# Patient Record
Sex: FEMALE | Race: WHITE | NOT HISPANIC OR LATINO | Employment: OTHER | URBAN - METROPOLITAN AREA
[De-identification: names, ages, dates, MRNs, and addresses within clinical notes are randomized per-mention and may not be internally consistent; named-entity substitution may affect disease eponyms.]

---

## 2017-03-17 ENCOUNTER — RECORDS - HEALTHEAST (OUTPATIENT)
Dept: MAMMOGRAPHY | Facility: CLINIC | Age: 62
End: 2017-03-17

## 2017-03-17 DIAGNOSIS — Z12.31 ENCOUNTER FOR SCREENING MAMMOGRAM FOR MALIGNANT NEOPLASM OF BREAST: ICD-10-CM

## 2017-04-07 ENCOUNTER — OFFICE VISIT - HEALTHEAST (OUTPATIENT)
Dept: FAMILY MEDICINE | Facility: CLINIC | Age: 62
End: 2017-04-07

## 2017-04-07 DIAGNOSIS — R06.83 SNORING: ICD-10-CM

## 2017-04-07 DIAGNOSIS — Z00.00 ROUTINE GENERAL MEDICAL EXAMINATION AT A HEALTH CARE FACILITY: ICD-10-CM

## 2017-04-07 DIAGNOSIS — M89.9 DISORDER OF BONE AND CARTILAGE: ICD-10-CM

## 2017-04-07 DIAGNOSIS — M67.449 MUCOUS CYST OF FINGER: ICD-10-CM

## 2017-04-07 DIAGNOSIS — J30.9 ALLERGIC RHINITIS: ICD-10-CM

## 2017-04-07 DIAGNOSIS — Z13.228 ENCOUNTER FOR SCREENING FOR OTHER METABOLIC DISORDERS: ICD-10-CM

## 2017-04-07 DIAGNOSIS — Z12.4 SCREENING FOR CERVICAL CANCER: ICD-10-CM

## 2017-04-07 DIAGNOSIS — M94.9 DISORDER OF BONE AND CARTILAGE: ICD-10-CM

## 2017-04-07 LAB
CHOLEST SERPL-MCNC: 193 MG/DL
FASTING STATUS PATIENT QL REPORTED: YES
HDLC SERPL-MCNC: 89 MG/DL
LDLC SERPL CALC-MCNC: 94 MG/DL
TRIGL SERPL-MCNC: 51 MG/DL

## 2017-04-07 ASSESSMENT — MIFFLIN-ST. JEOR: SCORE: 1088.95

## 2017-04-12 ENCOUNTER — COMMUNICATION - HEALTHEAST (OUTPATIENT)
Dept: FAMILY MEDICINE | Facility: CLINIC | Age: 62
End: 2017-04-12

## 2017-04-13 LAB
HPV INTERPRETATION - HISTORICAL: NORMAL
HPV INTERPRETER - HISTORICAL: NORMAL

## 2017-04-14 ENCOUNTER — OFFICE VISIT - HEALTHEAST (OUTPATIENT)
Dept: FAMILY MEDICINE | Facility: CLINIC | Age: 62
End: 2017-04-14

## 2017-04-14 DIAGNOSIS — R35.0 URINARY FREQUENCY: ICD-10-CM

## 2017-04-21 ENCOUNTER — RECORDS - HEALTHEAST (OUTPATIENT)
Dept: BONE DENSITY | Facility: CLINIC | Age: 62
End: 2017-04-21

## 2017-04-21 ENCOUNTER — RECORDS - HEALTHEAST (OUTPATIENT)
Dept: ADMINISTRATIVE | Facility: OTHER | Age: 62
End: 2017-04-21

## 2017-04-21 DIAGNOSIS — M89.9 DISORDER OF BONE, UNSPECIFIED: ICD-10-CM

## 2017-04-21 DIAGNOSIS — M94.9 DISORDER OF CARTILAGE, UNSPECIFIED: ICD-10-CM

## 2017-04-29 ENCOUNTER — COMMUNICATION - HEALTHEAST (OUTPATIENT)
Dept: FAMILY MEDICINE | Facility: CLINIC | Age: 62
End: 2017-04-29

## 2017-04-29 DIAGNOSIS — M81.0 OSTEOPOROSIS: ICD-10-CM

## 2017-05-15 ENCOUNTER — OFFICE VISIT - HEALTHEAST (OUTPATIENT)
Dept: ALLERGY | Facility: CLINIC | Age: 62
End: 2017-05-15

## 2017-05-15 DIAGNOSIS — R09.81 NASAL CONGESTION: ICD-10-CM

## 2017-05-15 ASSESSMENT — MIFFLIN-ST. JEOR: SCORE: 1088.95

## 2017-05-17 ENCOUNTER — COMMUNICATION - HEALTHEAST (OUTPATIENT)
Dept: ADMINISTRATIVE | Facility: CLINIC | Age: 62
End: 2017-05-17

## 2017-05-20 ENCOUNTER — HOSPITAL ENCOUNTER (OUTPATIENT)
Dept: ADMINISTRATIVE | Facility: OTHER | Age: 62
Discharge: HOME | End: 2017-05-20

## 2017-05-24 ENCOUNTER — COMMUNICATION - HEALTHEAST (OUTPATIENT)
Dept: ENDOCRINOLOGY | Facility: CLINIC | Age: 62
End: 2017-05-24

## 2017-05-24 ENCOUNTER — AMBULATORY - HEALTHEAST (OUTPATIENT)
Dept: ENDOCRINOLOGY | Facility: CLINIC | Age: 62
End: 2017-05-24

## 2017-05-24 DIAGNOSIS — M94.9 DISORDER OF BONE AND CARTILAGE: ICD-10-CM

## 2017-05-24 DIAGNOSIS — M89.9 DISORDER OF BONE AND CARTILAGE: ICD-10-CM

## 2017-05-30 ENCOUNTER — HOSPITAL ENCOUNTER (OUTPATIENT)
Dept: ADMINISTRATIVE | Facility: OTHER | Age: 62
Discharge: HOME | End: 2017-05-30

## 2017-06-09 ENCOUNTER — OFFICE VISIT - HEALTHEAST (OUTPATIENT)
Dept: ENDOCRINOLOGY | Facility: CLINIC | Age: 62
End: 2017-06-09

## 2017-06-09 DIAGNOSIS — M94.9 DISORDER OF BONE AND CARTILAGE: ICD-10-CM

## 2017-06-09 DIAGNOSIS — M81.0 OSTEOPOROSIS: ICD-10-CM

## 2017-06-09 DIAGNOSIS — M89.9 DISORDER OF BONE AND CARTILAGE: ICD-10-CM

## 2017-06-26 ENCOUNTER — OFFICE VISIT - HEALTHEAST (OUTPATIENT)
Dept: FAMILY MEDICINE | Facility: CLINIC | Age: 62
End: 2017-06-26

## 2017-06-26 DIAGNOSIS — L03.114 CELLULITIS OF LEFT HAND: ICD-10-CM

## 2017-06-26 ASSESSMENT — MIFFLIN-ST. JEOR: SCORE: 1093.49

## 2017-07-28 ENCOUNTER — OFFICE VISIT - HEALTHEAST (OUTPATIENT)
Dept: FAMILY MEDICINE | Facility: CLINIC | Age: 62
End: 2017-07-28

## 2017-07-28 DIAGNOSIS — A69.20 ERYTHEMA MIGRANS (LYME DISEASE): ICD-10-CM

## 2019-07-12 ENCOUNTER — OFFICE VISIT - HEALTHEAST (OUTPATIENT)
Dept: FAMILY MEDICINE | Facility: CLINIC | Age: 64
End: 2019-07-12

## 2019-07-12 DIAGNOSIS — Z12.11 SCREEN FOR COLON CANCER: ICD-10-CM

## 2019-07-12 DIAGNOSIS — S39.012A STRAIN OF LUMBAR REGION, INITIAL ENCOUNTER: ICD-10-CM

## 2019-07-12 DIAGNOSIS — Z12.31 VISIT FOR SCREENING MAMMOGRAM: ICD-10-CM

## 2019-07-12 ASSESSMENT — MIFFLIN-ST. JEOR: SCORE: 1101.66

## 2019-08-02 ENCOUNTER — RECORDS - HEALTHEAST (OUTPATIENT)
Dept: MAMMOGRAPHY | Facility: CLINIC | Age: 64
End: 2019-08-02

## 2019-08-02 DIAGNOSIS — Z12.31 ENCOUNTER FOR SCREENING MAMMOGRAM FOR MALIGNANT NEOPLASM OF BREAST: ICD-10-CM

## 2019-09-09 ENCOUNTER — RECORDS - HEALTHEAST (OUTPATIENT)
Dept: ADMINISTRATIVE | Facility: OTHER | Age: 64
End: 2019-09-09

## 2020-03-02 ENCOUNTER — OFFICE VISIT - HEALTHEAST (OUTPATIENT)
Dept: FAMILY MEDICINE | Facility: CLINIC | Age: 65
End: 2020-03-02

## 2020-03-02 DIAGNOSIS — L30.9 DERMATITIS: ICD-10-CM

## 2021-05-05 ENCOUNTER — OFFICE VISIT (OUTPATIENT)
Dept: FAMILY MEDICINE | Facility: CLINIC | Age: 66
End: 2021-05-05
Payer: COMMERCIAL

## 2021-05-05 VITALS
HEIGHT: 64 IN | OXYGEN SATURATION: 99 % | DIASTOLIC BLOOD PRESSURE: 82 MMHG | HEART RATE: 110 BPM | WEIGHT: 124 LBS | TEMPERATURE: 98 F | BODY MASS INDEX: 21.17 KG/M2 | SYSTOLIC BLOOD PRESSURE: 126 MMHG

## 2021-05-05 DIAGNOSIS — M25.511 RIGHT SHOULDER PAIN, UNSPECIFIED CHRONICITY: ICD-10-CM

## 2021-05-05 DIAGNOSIS — Z00.00 ROUTINE GENERAL MEDICAL EXAMINATION AT A HEALTH CARE FACILITY: Primary | ICD-10-CM

## 2021-05-05 DIAGNOSIS — M65.4 DE QUERVAIN'S DISEASE (TENOSYNOVITIS): ICD-10-CM

## 2021-05-05 DIAGNOSIS — Z12.31 ENCOUNTER FOR SCREENING MAMMOGRAM FOR BREAST CANCER: ICD-10-CM

## 2021-05-05 DIAGNOSIS — Z12.4 SCREENING FOR MALIGNANT NEOPLASM OF CERVIX: ICD-10-CM

## 2021-05-05 DIAGNOSIS — N93.0 BLEEDING AFTER INTERCOURSE: ICD-10-CM

## 2021-05-05 PROCEDURE — 87624 HPV HI-RISK TYP POOLED RSLT: CPT | Performed by: NURSE PRACTITIONER

## 2021-05-05 PROCEDURE — 99386 PREV VISIT NEW AGE 40-64: CPT | Mod: 25 | Performed by: NURSE PRACTITIONER

## 2021-05-05 PROCEDURE — 90471 IMMUNIZATION ADMIN: CPT | Performed by: NURSE PRACTITIONER

## 2021-05-05 PROCEDURE — 90715 TDAP VACCINE 7 YRS/> IM: CPT | Performed by: NURSE PRACTITIONER

## 2021-05-05 PROCEDURE — 99213 OFFICE O/P EST LOW 20 MIN: CPT | Mod: 25 | Performed by: NURSE PRACTITIONER

## 2021-05-05 ASSESSMENT — MIFFLIN-ST. JEOR: SCORE: 1093.49

## 2021-05-05 NOTE — PROGRESS NOTES
SUBJECTIVE:   CC: Cinthya Higgins is an 65 year old woman who presents for preventive health visit.       Patient has been advised of split billing requirements and indicates understanding: Yes  Healthy Habits:    Do you get at least three servings of calcium containing foods daily (dairy, green leafy vegetables, etc.)? yes    Amount of exercise or daily activities, outside of work: 1 hour(s) per day    Problems taking medications regularly No    Medication side effects: No    Have you had an eye exam in the past two years? no    Do you see a dentist twice per year? yes    Do you have sleep apnea, excessive snoring or daytime drowsiness?yes    Bled after sex once in March hasn't had intercourse since then, polyps in the past     Keeps reinjuring her right shoulder  Right thumb tendonitis symptoms and needs PT    Colonoscopy normal 9/9/2019  mammo 2019   DEXA scan 2017    Snores hoping to get a wedge pillow  Used to go to gym and not as much now     Today's PHQ-2 Score:   PHQ-2 ( 1999 [employer]) 5/5/2021 5/5/2021   Q1: Little interest or pleasure in doing things 0 0   Q2: Feeling down, depressed or hopeless 0 0   PHQ-2 Score 0 0       Abuse: Current or Past(Physical, Sexual or Emotional)- No  Do you feel safe in your environment? Yes    Have you ever done Advance Care Planning? (For example, a Health Directive, POLST, or a discussion with a medical provider or your loved ones about your wishes): Yes, will provide later.    Social History     Tobacco Use     Smoking status: Never Smoker     Smokeless tobacco: Never Used   Substance Use Topics     Alcohol use: Yes     Comment: infrequent glass of wine or beer     If you drink alcohol do you typically have >3 drinks per day or >7 drinks per week? No                     Reviewed orders with patient.  Reviewed health maintenance and updated orders accordingly - Yes  Labs reviewed in EPIC  BP Readings from Last 3 Encounters:   05/05/21 126/82   08/16/04 118/72    06/15/04 112/68    Wt Readings from Last 3 Encounters:   05/05/21 56.2 kg (124 lb)   08/16/04 60.3 kg (133 lb)   06/15/04 61.2 kg (135 lb)                  Patient Active Problem List   Diagnosis     CONTACT W OR EXPOSURE to asbestos     Past Surgical History:   Procedure Laterality Date     Z NONSPECIFIC PROCEDURE      Vag del x 2     ZZC NONSPECIFIC PROCEDURE      Ovarian cyst removed-benign     Z NONSPECIFIC PROCEDURE      Eye surgery     Z NONSPECIFIC PROCEDURE      Needle bx-(L) breast     Z NONSPECIFIC PROCEDURE      Endometrial bx       Social History     Tobacco Use     Smoking status: Never Smoker     Smokeless tobacco: Never Used   Substance Use Topics     Alcohol use: Yes     Comment: infrequent glass of wine or beer     Family History   Problem Relation Age of Onset     Hypertension Mother      Diabetes Father      Cancer Paternal Grandfather         lung cancer     Cancer Maternal Grandfather         luekemia     Alzheimer Disease Paternal Grandmother      Neurologic Disorder Maternal Grandmother         parkinson's disease     Arthritis Father          Current Outpatient Medications   Medication Sig Dispense Refill     CENTRUM SILVER OR TABS 1 TABLET DAILY 30 0     CRANBERRY CONCENTRATE 500 MG OR CAPS takes one daily 90 0     Allergies   Allergen Reactions     Ampicillin      No lab results found.     FSH-7: No flowsheet data found.    Mammogram Screening: Recommended mammography every 1-2 years with patient discussion and risk factor consideration  Pertinent mammograms are reviewed under the imaging tab.    Pertinent mammograms are reviewed under the imaging tab.  History of abnormal Pap smear: NO - age 30-65 PAP every 5 years with negative HPV co-testing recommended     Reviewed and updated as needed this visit by clinical staff  Tobacco  Allergies  Meds              Reviewed and updated as needed this visit by Provider                No past medical history on file.   Past Surgical  "History:   Procedure Laterality Date     [facility] NONSPECIFIC PROCEDURE      Vag del x 2     Z NONSPECIFIC PROCEDURE      Ovarian cyst removed-benign     Z NONSPECIFIC PROCEDURE      Eye surgery     [facility] NONSPECIFIC PROCEDURE      Needle bx-(L) breast     Z NONSPECIFIC PROCEDURE      Endometrial bx     OB History    Para Term  AB Living   4 2 2 0 0 2   SAB TAB Ectopic Multiple Live Births   0 0 0 0 2      # Outcome Date GA Lbr Jairo/2nd Weight Sex Delivery Anes PTL Lv   4  92        LESLY   3  87        LESLY   2 Term            1 Term                ROS:  Constitutional, eye, ENT, skin, breast, respiratory, cardiac, GI, , MSK, neuro, psych, and allergy are normal except as otherwise noted.       OBJECTIVE:   /82   Pulse 110   Temp 98  F (36.7  C) (Temporal)   Ht 1.619 m (5' 3.75\")   Wt 56.2 kg (124 lb)   SpO2 99%   BMI 21.45 kg/m    EXAM:  GENERAL: healthy, alert and no distress  EYES: Eyes grossly normal to inspection, PERRL and conjunctivae and sclerae normal  HENT: ear canals and TM's normal, nose and mouth without ulcers or lesions  NECK: no adenopathy, no asymmetry, masses, or scars and thyroid normal to palpation  RESP: lungs clear to auscultation - no rales, rhonchi or wheezes  BREAST: normal without masses, tenderness or nipple discharge and no palpable axillary masses or adenopathy  CV: regular rate and rhythm, normal S1 S2, no S3 or S4, no murmur, click or rub, no peripheral edema and peripheral pulses strong  ABDOMEN: soft, nontender, no hepatosplenomegaly, no masses and bowel sounds normal   (female): normal female external genitalia, normal urethral meatus , vaginal mucosa pink, moist, well rugated, normal cervix, adnexae, and uterus without masses. and cervical polyp vs other growth inside slightly friable, no vaginal atrophy good lubrication  MS: no gross musculoskeletal defects noted, no edema  SKIN: no suspicious lesions or rashes  NEURO: " "Normal strength and tone, mentation intact and speech normal  PSYCH: mentation appears normal, affect normal/bright    Diagnostic Test Results:  Labs reviewed in Epic    ASSESSMENT/PLAN:       ICD-10-CM    1. Routine general medical examination at a health care facility  Z00.00    2. Right shoulder pain, unspecified chronicity  M25.511 [facility]   3. De Quervain's disease (tenosynovitis)  M65.4     right thumb    4. Encounter for screening mammogram for breast cancer  Z12.31 *MA Screening Digital Bilateral   5. Bleeding after intercourse  N93.0 OB/GYN REFERRAL     HPV High Risk Types DNA Cervical     Pap imaged thin layer screen with HPV - recommended age 30 - 65 years (select HPV order below)   6. Screening for malignant neoplasm of cervix  Z12.4 HPV High Risk Types DNA Cervical     Pap imaged thin layer screen with HPV - recommended age 30 - 65 years (select HPV order below)   pt wanted physical, okay to address issues with RUE in PT and return to clinic if not improving with this or any concerns, would then Return to Clinic for appointment and consider imaging, consider Ortho and steroid shot    Patient has been advised of split billing requirements and indicates understanding: Yes  COUNSELING:   Reviewed preventive health counseling, as reflected in patient instructions    Estimated body mass index is 21.45 kg/m  as calculated from the following:    Height as of this encounter: 1.619 m (5' 3.75\").    Weight as of this encounter: 56.2 kg (124 lb).        She reports that she has never smoked. She has never used smokeless tobacco.      Counseling Resources:  ATP IV Guidelines  Pooled Cohorts Equation Calculator  Breast Cancer Risk Calculator  BRCA-Related Cancer Risk Assessment: FHS-7 Tool  FRAX Risk Assessment  ICSI Preventive Guidelines  Dietary Guidelines for Americans, 2010  USDA's MyPlate  ASA Prophylaxis  Lung CA Screening    Sarah Oates, CAMILLE CNP  [facility]  "

## 2021-05-05 NOTE — PATIENT INSTRUCTIONS
start with PT since you didn't want to work this up today we will assume it's arthritis and tendonitis. If not improving with this, please return if you do want a visit to address the shoulder or thumb in more detail.     Preventive Health Recommendations  Female Ages 50 - 64    Yearly exam: See your health care provider every year in order to  o Review health changes.   o Discuss preventive care.    o Review your medicines if your doctor has prescribed any.      Get a Pap test every three years (unless you have an abnormal result and your provider advises testing more often).    If you get Pap tests with HPV test, you only need to test every 5 years, unless you have an abnormal result.     You do not need a Pap test if your uterus was removed (hysterectomy) and you have not had cancer.    You should be tested each year for STDs (sexually transmitted diseases) if you're at risk.     Have a mammogram every 1 to 2 years.    Have a colonoscopy at age 50, or have a yearly FIT test (stool test). These exams screen for colon cancer.      Have a cholesterol test every 5 years, or more often if advised.    Have a diabetes test (fasting glucose) every three years. If you are at risk for diabetes, you should have this test more often.     If you are at risk for osteoporosis (brittle bone disease), think about having a bone density scan (DEXA).    Shots: Get a flu shot each year. Get a tetanus shot every 10 years.    Nutrition:     Eat at least 5 servings of fruits and vegetables each day.    Eat whole-grain bread, whole-wheat pasta and brown rice instead of white grains and rice.    Get adequate Calcium and Vitamin D.     Lifestyle    Exercise at least 150 minutes a week (30 minutes a day, 5 days a week). This will help you control your weight and prevent disease.    Limit alcohol to one drink per day.    No smoking.     Wear sunscreen to prevent skin cancer.     See your dentist every six months for an exam and  cleaning.    See your eye doctor every 1 to 2 years.    Patient Education     Understanding De Quervain Tenosynovitis   De Quervain tenosynovitis is a condition that can cause wrist and thumb pain. Tendons connect muscles in your wrist and forearm to the bones in your thumb. The tendons have a protective cover (sheath). The sheath s lining makes a fluid that lets the tendons slide easily when you straighten your wrist and thumb. If any of these tendons are irritated or injured, they can become swollen and inflamed. This is called de Quervain tenosynovitis.      How to say it  Darin ten-oh-sin-oh-VY-tis   What causes de Quervain tenosynovitis?   This condition is most often caused from overuse. For example, making the same wrist motions over and over can irritate the tendons. This includes doing things like unscrewing jar lids or grasping a tool. Activities such as typing, playing racquet sports, knitting, and texting can also lead to the condition.   Symptoms of de Quervain tenosynovitis   You may have pain, soreness, redness, and swelling along the side of your wrist and the base of your thumb. You may feel pain when you pinch or grasp things, turn or touch your wrist, or make a fist. Your thumb may catch or make a crackling sound when you move it.   Treatment for de Quervain tenosynovitis   Treatments may include:    Resting the wrist and thumb. This involves limiting movements that make your symptoms worse. You also may need to avoid certain hobbies, sports, and types of work for a time.    Cold packs. These help reduce pain and swelling.    Prescription or over-the-counter medicines. These help relieve pain and swelling. NSAIDs (nonsteroidal anti-inflammatory drugs) are the most common medicines used. Medicines may be prescribed or bought over the counter. They may be given as pills. Or they may be put on the skin as a gel, cream, or patch.    Splint or brace.  This helps keep the thumb and wrist from  moving and gives time for your tendons to heal.    Exercises or physical therapy. These help stretch, strengthen, and improve the range of motion in your wrist and thumb.    Shots of medicine into the area around the tendon.  These usually are anti-inflammatory medicines called corticosteroids . They may help ease pain and swelling .    Surgery. You may need surgery if other treatments don t relieve symptoms. During surgery, the surgeon releases the sheath that surrounds the tendons so the tendons can move more easily.  Possible complications of de Quervain tenosynovitis   Without proper care and treatment, healing may take longer than normal. Also, symptoms may continue or get worse. Over time, the problem may become long-term (chronic). This can make it hard to use your wrist and thumb for normal activities.   When to call your healthcare provider   Call your healthcare provider right away if you have any of these:     Fever of 100.4 F (38 C) or higher, or as directed by your provider    Chills    Symptoms that don t get better with treatment, or get worse    Pain, numbness, or coldness in the hand    New symptoms  Sang last reviewed this educational content on 12/1/2019 2000-2021 [employer]. All rights reserved. This information is not intended as a substitute for professional medical care. Always follow your healthcare professional's instructions.

## 2021-05-06 DIAGNOSIS — Z12.4 SCREENING FOR MALIGNANT NEOPLASM OF CERVIX: ICD-10-CM

## 2021-05-06 DIAGNOSIS — N93.0 BLEEDING AFTER INTERCOURSE: ICD-10-CM

## 2021-05-06 PROCEDURE — G0145 SCR C/V CYTO,THINLAYER,RESCR: HCPCS | Performed by: NURSE PRACTITIONER

## 2021-05-07 ENCOUNTER — THERAPY VISIT (OUTPATIENT)
Dept: PHYSICAL THERAPY | Facility: CLINIC | Age: 66
End: 2021-05-07
Attending: NURSE PRACTITIONER
Payer: COMMERCIAL

## 2021-05-07 DIAGNOSIS — M25.511 RIGHT SHOULDER PAIN, UNSPECIFIED CHRONICITY: ICD-10-CM

## 2021-05-07 DIAGNOSIS — S49.91XA SHOULDER INJURY, RIGHT, INITIAL ENCOUNTER: ICD-10-CM

## 2021-05-07 PROCEDURE — 97110 THERAPEUTIC EXERCISES: CPT | Mod: GP | Performed by: PHYSICAL THERAPIST

## 2021-05-07 PROCEDURE — 97161 PT EVAL LOW COMPLEX 20 MIN: CPT | Mod: GP | Performed by: PHYSICAL THERAPIST

## 2021-05-07 NOTE — PROGRESS NOTES
Physical Therapy Initial Evaluation  Subjective:  [facility]                  Physical Therapy Initial Examination/Evaluation  May 7, 2021    Cinthya Higgins is a 65 year old female referred to physical therapy for treatment of right shoulder pain with Precautions/Restrictions/MD instructions none  Pain with shoulder press and bench press.  Pain is on and off for a year.  Onset 5/7/20 insidiously, fell on it in jan 2021-this decreased and then on and off reinjury  Patient starts exercise program aggravates it, stops exercising and it goes away., recent pain is at the shoulder blade.  Right thumb aggravation in the last few weeks-gripping-gardening.  Thumb hurts in the thenar region.  Pt enjoys walking for exercise  Stopped yoga-was doing at least 1 time a week  Has hand weights at home    Subjective:  DOI/onset: 5/7/20   Acute Injury or Gradual Onset?: Acute injury onset  Mechanism of Injury:  See above  Related PMH: dizziness over 30 yrs-BPPV, lying flat and looking at ceiling can aggravate Imaging: None  Chief Complaint/Functional Limitations: washing back, lifting, reaching overhead, sleeping-tends to ly on that shoulder  and see below in therapy evaluation codes   Pain: rest 3 /10, activity 6/10 Location: periscap, and post lateral shoulder Frequency: Intermittent Described as: aching and sharp Alleviated by: Nothing Progression of Symptoms: up and down Time of day when pain is worse: Activity related and Position related  Sleeping: No issues/uninterrupted   Occupation: retired    Patient's goals are see chief complaints preventative program    Other pertinent PMH/Red Flags: Osteoporosis   Barriers at home/work: None as reported by patient  Pertinent Surgical History: none to shoulder, neck  Medications: None as reported by patient  General health as reported by patient: good  Return to MD:  Not at this time      Objective:  System                   Shoulder Evaluation:  ROM:  AROM:    Flexion:  Left:  180    Right:   180    Abduction:  Left: 180   Right:  180                Flexion/External Rotation:  Left:  C7    Right:  C7 and pain  Extension/Internal Rotation:  Left:  T5    Right:  T10, and pain          Strength:    Flexion: Left:5/5   Pain:    Right: 5/5     Pain:     Abduction:  Left: 5/5  Pain:    Right: 5/5     Pain:    Internal Rotation:  Left:5/5     Pain:    Right: 5/5     Pain:  External Rotation:   Left:5/5     Pain:   Right:4-/5      Pain:+                                                     General     ROS    Assessment/Plan:    Patient is a 65 year old female with right side shoulder complaints.    Patient has the following significant findings with corresponding treatment plan.                Diagnosis 1:  Right shoulder pain  Pain -  hot/cold therapy and manual therapy  Decreased strength - therapeutic exercise and therapeutic activities  Decreased function - therapeutic activities    Therapy Evaluation Codes:   1) History comprised of:   Personal factors that impact the plan of care:      None.    Comorbidity factors that impact the plan of care are:      None.     Medications impacting care: None.  2) Examination of Body Systems comprised of:   Body structures and functions that impact the plan of care:      Shoulder.   Activity limitations that impact the plan of care are:      Bathing, Bending, Cooking and Dressing.  3) Clinical presentation characteristics are:   Stable/Uncomplicated.  4) Decision-Making    Low complexity using standardized patient assessment instrument and/or measureable assessment of functional outcome.  Cumulative Therapy Evaluation is: Low complexity.    Previous and current functional limitations:  (See Goal Flow Sheet for this information)    Short term and Long term goals: (See Goal Flow Sheet for this information)     Communication ability:  Patient appears to be able to clearly communicate and understand verbal and written communication and follow directions correctly.  Treatment  Explanation - The following has been discussed with the patient:   RX ordered/plan of care  Anticipated outcomes  Possible risks and side effects  This patient would benefit from PT intervention to resume normal activities.   Rehab potential is good.    Frequency:  1 X week, once daily  Duration:  for 8 weeks  Discharge Plan:  Achieve all LTG.  Independent in home treatment program.  Reach maximal therapeutic benefit.    Please refer to the daily flowsheet for treatment today, total treatment time and time spent performing 1:1 timed codes.

## 2021-05-10 ENCOUNTER — ANCILLARY PROCEDURE (OUTPATIENT)
Dept: MAMMOGRAPHY | Facility: CLINIC | Age: 66
End: 2021-05-10
Attending: NURSE PRACTITIONER
Payer: COMMERCIAL

## 2021-05-10 DIAGNOSIS — Z12.31 ENCOUNTER FOR SCREENING MAMMOGRAM FOR BREAST CANCER: ICD-10-CM

## 2021-05-10 LAB
COPATH REPORT: NORMAL
PAP: NORMAL

## 2021-05-10 PROCEDURE — 77067 SCR MAMMO BI INCL CAD: CPT | Mod: 26 | Performed by: RADIOLOGY

## 2021-05-10 PROCEDURE — 77067 SCR MAMMO BI INCL CAD: CPT

## 2021-05-12 LAB
FINAL DIAGNOSIS: NORMAL
HPV HR 12 DNA CVX QL NAA+PROBE: NEGATIVE
HPV16 DNA SPEC QL NAA+PROBE: NEGATIVE
HPV18 DNA SPEC QL NAA+PROBE: NEGATIVE
SPECIMEN DESCRIPTION: NORMAL
SPECIMEN SOURCE CVX/VAG CYTO: NORMAL

## 2021-05-13 PROBLEM — Z12.4 CERVICAL CANCER SCREENING: Status: ACTIVE | Noted: 2021-05-13

## 2021-05-18 ENCOUNTER — OFFICE VISIT (OUTPATIENT)
Dept: OBGYN | Facility: CLINIC | Age: 66
End: 2021-05-18
Payer: COMMERCIAL

## 2021-05-18 VITALS
BODY MASS INDEX: 22.14 KG/M2 | WEIGHT: 128 LBS | OXYGEN SATURATION: 99 % | SYSTOLIC BLOOD PRESSURE: 147 MMHG | DIASTOLIC BLOOD PRESSURE: 90 MMHG | HEART RATE: 111 BPM

## 2021-05-18 DIAGNOSIS — N84.1 CERVICAL POLYP: Primary | ICD-10-CM

## 2021-05-18 PROCEDURE — 99203 OFFICE O/P NEW LOW 30 MIN: CPT | Performed by: OBSTETRICS & GYNECOLOGY

## 2021-05-18 NOTE — PROGRESS NOTES
GYN Clinic Visit    Date of visit: 2021     Chief Complaint:   Chief Complaint   Patient presents with     Other     bleeding after intercourse        HPI:   Cinthya Higgins is a 65 year old  who presents to clinic today to discuss one episode of post-coital bleeding and cervical polyp noted on annual physical exam.    She has not had intercourse since the one episode of bleeding, so unsure if it would happen again.  No pain associated with bleeding.    No other episodes of vaginal bleeding that have occurred after menopause.    Medications:  CENTRUM SILVER OR TABS, 1 TABLET DAILY  CRANBERRY CONCENTRATE 500 MG OR CAPS, takes one daily    No current facility-administered medications on file prior to visit.       Allergy:  Allergies   Allergen Reactions     Ampicillin      Patient denies food, latex or environmental allergies.     Obstetric History:   OB History    Para Term  AB Living   4 2 2 0 0 2   SAB TAB Ectopic Multiple Live Births   0 0 0 0 2      # Outcome Date GA Lbr Jairo/2nd Weight Sex Delivery Anes PTL Lv   4  92        LESLY   3  87        LESLY   2 Term            1 Term                Gynecologic History:  Patient's last menstrual period was 2004.  Last Pap: 21, NIL and HPV neg    History reviewed. No pertinent past medical history.    Past Surgical History:   Procedure Laterality Date     Z NONSPECIFIC PROCEDURE      Vag del x 2     ZZ NONSPECIFIC PROCEDURE      Ovarian cyst removed-benign     Z NONSPECIFIC PROCEDURE      Eye surgery     ZZ NONSPECIFIC PROCEDURE      Needle bx-(L) breast     ZZ NONSPECIFIC PROCEDURE      Endometrial bx       Social History:  Alcohol use  Social History    Substance and Sexual Activity      Alcohol use: Yes        Comment: infrequent glass of wine or beer    Tobacco use  History   Smoking Status     Never Smoker   Smokeless Tobacco     Never Used     Drug use  History   Drug Use Not on file      Sexual history  History   Sexual Activity     Sexual activity: Yes     Partners: Male       Family History   Problem Relation Age of Onset     Diabetes Father      Arthritis Father      Hypertension Mother      Dementia Mother      Cancer Paternal Grandfather         lung cancer     Cancer Maternal Grandfather         luekemia     Alzheimer Disease Paternal Grandmother      Neurologic Disorder Maternal Grandmother         parkinson's disease         Health screening:   Breast cancer screenin  Colon cancer screenin    Review of Systems:  Neg except as noted in HPI    Physical Exam:  Vitals:    21 1119   BP: (!) 147/90   Pulse: 111   SpO2: 99%   Weight: 58.1 kg (128 lb)       Gen: NAD, Awake and alert, cooperative with exam  Abd: soft, nontender, nondistended  Extremities: nontender, no edema  : normal appearing external genitalia, intact mildly atrophic appearing vaginal mucosa without lesions or abnormal discharge; cervix appears smooth, WNL;  Can visualize part of endocervical through multiparous external os, but no cervical polyp was visualized.       Reviewed documentation from previous office visits with PCP, pap results and previous gyn office visit.    Assessment:  Cinthya Higgins is a 65 year old  who presents with chief complaint   Chief Complaint   Patient presents with     Other     bleeding after intercourse      Diagnosis:   1. Cervical polyp  Had planned to remove cervical polyp today, but none was visualized.  This may have been removed incidentally with pap smear and I suspect I can see where it may have been.  Discussed if she has any further episodes of post-coital or post-menopausal bleeding, she should make an appointment with gyn.  Does not need to see PCP first for bleeding unless she desires.    Follow up: princessn.    Nadia Olvera MD

## 2021-05-24 ENCOUNTER — THERAPY VISIT (OUTPATIENT)
Dept: PHYSICAL THERAPY | Facility: CLINIC | Age: 66
End: 2021-05-24
Payer: COMMERCIAL

## 2021-05-24 DIAGNOSIS — S49.91XA SHOULDER INJURY, RIGHT, INITIAL ENCOUNTER: ICD-10-CM

## 2021-05-24 PROCEDURE — 97530 THERAPEUTIC ACTIVITIES: CPT | Performed by: PHYSICAL THERAPIST

## 2021-05-24 PROCEDURE — 97110 THERAPEUTIC EXERCISES: CPT | Performed by: PHYSICAL THERAPIST

## 2021-05-29 ENCOUNTER — RECORDS - HEALTHEAST (OUTPATIENT)
Dept: ADMINISTRATIVE | Facility: CLINIC | Age: 66
End: 2021-05-29

## 2021-05-30 ENCOUNTER — RECORDS - HEALTHEAST (OUTPATIENT)
Dept: ADMINISTRATIVE | Facility: CLINIC | Age: 66
End: 2021-05-30

## 2021-05-30 VITALS — WEIGHT: 123 LBS | BODY MASS INDEX: 21.28 KG/M2

## 2021-05-30 VITALS — BODY MASS INDEX: 21 KG/M2 | HEIGHT: 64 IN | WEIGHT: 123 LBS

## 2021-05-30 NOTE — PROGRESS NOTES
"SUBJECTIVE: Cinthya Higgins is a 63 y.o. female with:  Chief Complaint   Patient presents with     Back Pain     injured back on 4th of july.    She was carrying a heavy bag of potting soil to her car. She went home and was doing some gardening.  Pain in back started a few days afterwards.  Has pain in right lower back going to right flank / abdomen.  No leg pain / numbness/tingling / weakness.  No bowel and bladder dysfunction.  She is taking Advil 1 every 4 hours.  She is fine if standing but has increased pain with sleeping/ driving.  Very still in am.  Pain is a little better.  Ice not helping - switched to heat and better.  [place].    OBJECTIVE: /80 (Patient Site: Left Arm, Patient Position: Sitting, Cuff Size: Adult Regular)   Pulse 86   Ht 5' 3.75\" (1.619 m)   Wt 125 lb 12.8 oz (57.1 kg)   BMI 21.76 kg/m   no distress  Back: No vertebral tenderness.  Tender over right sacral area.  Her straight leg-raise is negative bilaterally.  Decreased flexion but good extension.  lower extremities: DTRs symmetric.  She is able to toe/ heel walk.    Cinthya was seen today for back pain.    Diagnoses and all orders for this visit:    Strain of lumbar region, initial encounter  -     baclofen (LIORESAL) 10 MG tablet; Take 1 tablet (10 mg total) by mouth 3 (three) times a day.  Gentle stretching/ avoid aggravating activities.  If no better consider PT referral.    Visit for screening mammogram  -     Mammo Screening Bilateral; Future    Screen for colon cancer  -     Ambulatory referral for Colonoscopy     Lucia Hung   "

## 2021-05-31 ENCOUNTER — RECORDS - HEALTHEAST (OUTPATIENT)
Dept: ADMINISTRATIVE | Facility: CLINIC | Age: 66
End: 2021-05-31

## 2021-05-31 VITALS — WEIGHT: 123 LBS | BODY MASS INDEX: 21 KG/M2 | HEIGHT: 64 IN

## 2021-05-31 VITALS — BODY MASS INDEX: 21.83 KG/M2 | WEIGHT: 126.2 LBS

## 2021-05-31 VITALS — BODY MASS INDEX: 21.17 KG/M2 | HEIGHT: 64 IN | WEIGHT: 124 LBS

## 2021-05-31 VITALS — WEIGHT: 125 LBS | BODY MASS INDEX: 21.62 KG/M2

## 2021-06-01 ENCOUNTER — RECORDS - HEALTHEAST (OUTPATIENT)
Dept: ADMINISTRATIVE | Facility: CLINIC | Age: 66
End: 2021-06-01

## 2021-06-02 ENCOUNTER — TRANSFERRED RECORDS (OUTPATIENT)
Dept: HEALTH INFORMATION MANAGEMENT | Facility: CLINIC | Age: 66
End: 2021-06-02

## 2021-06-03 VITALS — WEIGHT: 125.8 LBS | HEIGHT: 64 IN | BODY MASS INDEX: 21.48 KG/M2

## 2021-06-04 VITALS
SYSTOLIC BLOOD PRESSURE: 125 MMHG | WEIGHT: 125.25 LBS | HEART RATE: 83 BPM | OXYGEN SATURATION: 100 % | DIASTOLIC BLOOD PRESSURE: 80 MMHG | BODY MASS INDEX: 21.67 KG/M2

## 2021-06-06 NOTE — PROGRESS NOTES
OFFICE VISIT - FAMILY MEDICINE     ASSESSMENT AND PLAN     1. Dermatitis     We did review possible differential diagnosis, included viral exanthema, allergic reaction etc., we discussed symptomatic care with topical skin moisturizer, antihistaminic, good hydration etc., she will continue with current treatment with Claritin and or Benadryl as needed, and let us know if she felt improved.    CHIEF COMPLAINT   Rash (all over body, started 2/29/20 in the morning, itchy and nerve pain. patient is prone to hives. )    [facility]   Cinthya Higgins is a 64 y.o. female.  No Patient Care Coordination Note on file.    She did have flulike symptoms about a week ago, with fever, runny nose, nasal congestion, body ache, and about 3 or 4 days ago she started noticing a rash, all over  her body.  It appears like small dots, minimal itching, warm to the touch and very sensitive.  Striata which beni with some improvement, has also been taking Claritin during daytime and Benadryl at night as needed.  She does mention a history of hives in the past.  She is also adding that she works as a , stressful type of situation tends to make her have hives.  She denies any difficulty breathing, no headaches, dizziness or heart palpitation.  No specific exposure that patient is aware of.    Review of Systems As per HPI, otherwise negative.    OBJECTIVE   /80 (Patient Site: Left Arm, Patient Position: Sitting, Cuff Size: Adult Regular)   Pulse 83   Wt 125 lb 4 oz (56.8 kg)   SpO2 100%   BMI 21.67 kg/m    Physical Exam   Constitutional: She is oriented to person, place, and time. She appears well-developed and well-nourished.   HENT:   Head: Normocephalic and atraumatic.   Neck: Normal range of motion. Neck supple. No JVD present. No tracheal deviation present. No thyromegaly present.   Cardiovascular: Normal rate, regular rhythm, normal heart sounds and intact distal pulses. Exam reveals no gallop and no friction rub.   No  murmur heard.  Pulmonary/Chest: Effort normal and breath sounds normal. No respiratory distress. She has no wheezes. She has no rales.   Musculoskeletal:         General: No tenderness or edema.   Lymphadenopathy:     She has no cervical adenopathy.   Neurological: She is alert and oriented to person, place, and time. Coordination normal.   Skin:   Maculopapular rash involving the trunk, upper extremity area, no sign of excoriation or exudates.   Psychiatric: She has a normal mood and affect. Judgment and thought content normal.       PFSH     Family History   Problem Relation Age of Onset     Breast cancer Neg Hx      Social History     Socioeconomic History     Marital status:      Spouse name: Not on file     Number of children: Not on file     Years of education: Not on file     Highest education level: Not on file   Occupational History     Not on file   Social Needs     Financial resource strain: Not on file     Food insecurity:     Worry: Not on file     Inability: Not on file     Transportation needs:     Medical: Not on file     Non-medical: Not on file   Tobacco Use     Smoking status: Never Smoker     Smokeless tobacco: Never Used   Substance and Sexual Activity     Alcohol use: Not on file     Drug use: Not on file     Sexual activity: Not on file   Lifestyle     Physical activity:     Days per week: Not on file     Minutes per session: Not on file     Stress: Not on file   Relationships     Social connections:     Talks on phone: Not on file     Gets together: Not on file     Attends Scientologist service: Not on file     Active member of club or organization: Not on file     Attends meetings of clubs or organizations: Not on file     Relationship status: Not on file     Intimate partner violence:     Fear of current or ex partner: Not on file     Emotionally abused: Not on file     Physically abused: Not on file     Forced sexual activity: Not on file   Other Topics Concern     Not on file   Social  History Narrative     Not on file     Relevant history was reviewed with the patient today, unless noted in HPI, nothing is pertinent for this visit.  [facility]     Patient Active Problem List    Diagnosis Date Noted     Osteoporosis 06/16/2017     Nontoxic Multinodular Goiter      Overview Note:     Created by Conversion         Osteopenia      Overview Note:     Created by Conversion    Replacement Utility updated for latest IMO load       Allergic Rhinitis      Overview Note:     Created by Conversion  [facility] Annotation: Nov 8 2010  9:49PM - Julisa Boone: mild seasonal sx    Replacement Utility updated for latest IMO load       Cervical Polyps      Overview Note:     Created by Conversion         Past Surgical History:   Procedure Laterality Date     BREAST CYST ASPIRATION Left      [place] REMOVAL OF OVARIAN CYST(S)      Description: Ovarian Cystectomy;  Proc Date: 01/01/1980;       RESULTS/CONSULTS (Lab/Rad)   No results found for this or any previous visit (from the past 168 hour(s)).  No results found.  MEDICATIONS     Current Outpatient Medications on File Prior to Visit   Medication Sig Dispense Refill     FA/MV,CA,IRON,MIN/LYCOPENE/LUT (MULTIVITAL ORAL) Take by mouth.       fluticasone (FLONASE) 50 mcg/actuation nasal spray 2 squirts in each nostril daily 18 g 11     loratadine (CLARITIN) 10 mg tablet Take 10 mg by mouth daily.       omega-3 fatty acids-vitamin E (FISH OIL) 1,000 mg cap Take 1 capsule by mouth daily.       No current facility-administered medications on file prior to visit.        HEALTH MAINTENANCE / SCREENING   PHQ-2 Total Score: 0 (7/12/2019 11:52 AM)  , No data recorded,No data recorded  Immunization History   Administered Date(s) Administered     DT (pediatric) 01/01/2001     Influenza, inj, historic,unspecified 10/15/2010, 11/02/2011, 12/13/2012, 10/17/2013, 10/06/2015, 11/02/2015     Influenza, seasonal,quad inj 6-35 mos 11/02/2014     Influenza,seasonal quad, PF, =/> 6months 09/19/2017,  10/04/2019     Td,adult,historic,unspecified 01/01/2001     Tdap 01/31/2008, 11/08/2010     Health Maintenance   Topic     HEPATITIS C SCREENING      HIV SCREENING      ZOSTER VACCINES (1 of 2)     PREVENTIVE CARE VISIT      TD 18+ HE      ADVANCE CARE PLANNING      MAMMOGRAM      PAP SMEAR      LIPID      HPV TEST      COLONOSCOPY      INFLUENZA VACCINE RULE BASED      TDAP ADULT ONE TIME DOSE        Eliud Baumann MD  Family Medicine, [facility]     This note was dictated using a voice recognition software.  Any grammatical or context distortion are unintentional and inherent to the software.

## 2021-06-09 NOTE — PROGRESS NOTES
"FEMALE ADULT PREVENTIVE EXAM    CHIEF COMPLAINT:  Female preventive exam.    SUBJECTIVE:  Cinthya Higgins is a 61 y.o. female who presents for her routine physical exam.    Patient would like to address the following concerns today:   Chief Complaint   Patient presents with     Annual Exam     FASTING     Questions For Providers/results     SPRING AND FALL - ALLERGIES / SINUS \"SEEMS TO HAVE CONTIUNE - CAN'T FIGURE OUT WHY IT'S CONTIUNE, TAKING CLARITIN     Referral     dxa     Questions For Providers/results     SLEEP STUDY - \"HUSBANDS STATES THAT SHE SNORES, AND STOP BREATHING SOMETIMES.      Mass      1) IN THE LAST MONTH SHE HAS NOTICED A BUMP ON HER LEFT MIDDLE AND RIGHT 5TH FINGER.  SHE HAS HAD PAIN AT TIMES BUT NOT ALWAYS.  SHE CAN USE HER HANDS OK.  2) SHE HAS HAD WORSENING ALLERGIES.  SHE HAS SYMPTOMS ALL WINTER LONG.  sHE THINKS HER OFFICE IS TO BLAME AND WORRIED ABOUT MOLD. SHE STARTED CLARITIN DAILY OR ELSE SHE HAS SINUS HEADACHES.  HAS CONSTANT PND.  HER HEAD FEELS CONFESTED.  IF SHE DOES NOT TAKE CLARITIN GETS SINUS HEADACHES.    3) OSTEOPENIA- LAST SCAN YEARS AGO- 2010 WITH LOW BONE MASS.  SHE IS TAKING CITRICAL 500 MG CALCIUM ONCE A DAY.  4)  STATES SHE SNORES AT NIGHT AND SOMETIMES SHE HAS APNEIC SPELLS.   SHE FEELS TIRED A LOT BUT NO REAL DAYTIME SLEEPINESS.      GYNE HISTORY  Menses: post-menopausal  Sexually Active: yes  Pap: 12/13 normal with negative HPV  Abnormal Pap: colposcopy in the 1980s      She  has no past medical history on file.    Lab Results   Component Value Date    WBC 4.4 11/08/2010    HGB 12.7 11/08/2010    HCT 37.7 11/08/2010    MCV 98 11/08/2010     11/08/2010     11/08/2010    K 4.5 11/08/2010    BUN 14 11/08/2010     Lab Results   Component Value Date    CHOL 217 (H) 03/20/2012    HDL 87 03/20/2012    LDLCALC 110 03/20/2012    TRIG 100 03/20/2012     Lab Results   Component Value Date    TSH 2.3 11/08/2010     BP Readings from Last 3 Encounters: "   04/07/17 120/70   12/15/14 114/74       Surgeries:    Past Surgical History:   Procedure Laterality Date     BREAST CYST ASPIRATION Left      [place] REMOVAL OF OVARIAN CYST(S)      Description: Ovarian Cystectomy;  Proc Date: 01/01/1980;       Family History:  Her family history is negative for Breast cancer.    Social History:  She  reports that she has never smoked. She has never used smokeless tobacco. .  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  Alcohol- 2 drinks a week.    Medications:    Current Outpatient Prescriptions:      calcium-vitamin D (CALCIUM-VITAMIN D) 500 mg(1,250mg) -200 unit per tablet, Take 1 tablet by mouth daily., Disp: , Rfl:      loratadine (CLARITIN) 10 mg tablet, Take 10 mg by mouth daily., Disp: , Rfl:      omega-3 fatty acids-vitamin E (FISH OIL) 1,000 mg cap, Take 1 capsule by mouth daily., Disp: , Rfl:   HELD MEDICATIONS: None.      Allergies:  No latex allergies.  Allergies   Allergen Reactions     Ampicillin             RISK BEHAVIOR & HEALTH HABITS  Regular Exercise: yoga/ circuit class/ free weights/ stairs.  Balanced diet: yes- cut out wheat and sugar and feels better.  Calcium/vitamin D: Citracal 1/2 serving a day  Stress management: meditation  Seat Belt Use: yes    Dexa: 2010 low bone mass  Colonoscopy:2008 no polpys  Mammogram: 3/17 normal    Guns: NO  Dental Care: YES    REVIEW OF SYSTEMS:  Complete head to toe review of systems is otherwise negative except as above.    OBJECTIVE:  VITAL SIGNS:    Vitals:    04/07/17 0803   BP: 120/70   Pulse: 68   Resp: 12     GENERAL:  Patient alert, in no acute distress.  EYES: PERRLA. Extraoccular movements intact, pupils equal, reactive to light and accommodation.  Normal conjunctiva and lids.   ENT:  Hearing grossly normal.  Normal appearance to ears and nose.  Bilateral TM s, external canals, oropharynx normal. Normal lips, gums and teeth.   NECK:  Supple, without thyromegaly or  mass.  RESP:  Clear to auscultation without crackles, wheezes or distress.  Normal respiratory effort.   CV:  Regular rate and rhythm without murmurs, rubs or gallops.  Normal pedal pulses.  No varicosities or edema.  ABDOMEN:  Soft, non-tender, without hepatosplenomegaly, masses, or hernias.   BREASTS:  Nontender, without masses, nipple discharge, erythema, or axillary adenopathy.    GYN:  Normal external genitalia.  Vagina pink with scant discharge.  Cervix pink with no cervical motion tenderness.  Uterus small, anteverted and non tender.  No adnexal tenderness or enlargement.  LYMPHATIC: No cervical or axillary lymphadenopathy.  No bruising.  NEURO:  CN II-XII intact, motor & sensory function all intact.  DTR and reflexes normal.  PSYCHIATRIC:  Alert & oriented with normal mood and affect.  Good judgment and insight.  SKIN:  Normal inspection and palpation.  MUSCULOSKELETAL: Normal gait and station.  - Spine / Ribs / Pelvis: Normal inspection, ROM, stability and strength: Spine, Head, Neck, Upper and Lower Extremities.      Cinthya was seen today for annual exam, questions for providers/results, referral, questions for providers/results and mass.    Diagnoses and all orders for this visit:    Routine general medical examination at a health care facility  Colonoscopy due next year.  Mammogram done.  Pap done today.    Osteopenia  -     DXA Bone Density Scan; Future  -     Vitamin D, Total (25-Hydroxy)    Allergic rhinitis- I am going to start her on Flonase spray to see if it helps her symptoms.  Refer for allergy testing.  -     Ambulatory referral to Allergy  -     fluticasone (FLONASE) 50 mcg/actuation nasal spray; 2 squirts in each nostril daily    Screening for cervical cancer  -     Gynecologic Cytology (PAP Smear)    Encounter for screening for other metabolic disorders  -     Lipid Cascade    Mucous cyst of finger- Discussed options for treatment- referral to hand surgeon vs observation. She would like to  observe for now.    Snoring- Her  is going to evaluate her apnea and if recurrent will send for sleep study.      Lucia Hung

## 2021-06-10 NOTE — PROGRESS NOTES
ASSESSMENT:   Urinary frequency and urgency - UA negative.  Patient was instructed that this could be secondary to cystocele, which was noted on her last physical exam with her PCP.  She has two children.      PLAN:  Gyn referral for urinary frequency and bladder prolapse with negative UA  - Reassurance that this is not infection    Arely Patel MD      SUBJECTIVE:   Cinthya Higgins is a 61 y.o. female presents today, with concerns of a urinary tract infection. She is going out of town this weekend and wanted to get checked prior to this.  She has had urinary urgency and some frequency.  She is aware that she has a lower bladder than normal.  No pain with urination.  She notes that she has had irritation.  No vaginal itching or changes in vaginal discharge.  She has never had a urinary tract infection before but has had a kidney stone and it doesn't feel like it.       Of note, she had a physical a week ago and was told her bladder is falling.  She was instructed to follow up if she starts to have symptoms of urinary things.  She is interested in gyn evaluation.  She has two children.       Patient Active Problem List   Diagnosis     Nontoxic Multinodular Goiter     Osteopenia     Allergic Rhinitis     Cervical Polyps       History   Smoking Status     Never Smoker   Smokeless Tobacco     Never Used       Current Medications:  Current Outpatient Prescriptions on File Prior to Visit   Medication Sig Dispense Refill     calcium-vitamin D (CALCIUM-VITAMIN D) 500 mg(1,250mg) -200 unit per tablet Take 1 tablet by mouth daily.       fluticasone (FLONASE) 50 mcg/actuation nasal spray 2 squirts in each nostril daily 18 g 11     loratadine (CLARITIN) 10 mg tablet Take 10 mg by mouth daily.       omega-3 fatty acids-vitamin E (FISH OIL) 1,000 mg cap Take 1 capsule by mouth daily.       No current facility-administered medications on file prior to visit.        Allergies:   Allergies   Allergen Reactions     Ampicillin         OBJECTIVE:   Vitals:    04/14/17 1949   BP: 122/62   Pulse: 88   Resp: 15   Temp: 98.5  F (36.9  C)   TempSrc: Oral   SpO2: 100%   Weight: 123 lb (55.8 kg)     Physical exam reveals a 61 y.o. female.   Appears healthy, alert and cooperative.  GYN: deferred.
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
## 2021-06-11 NOTE — PROGRESS NOTES
[facility]  ENDOCRINOLOGY    Osteoporosis Follow Up 6/16/2017    Cinthya Higgins, 1955, [id number]          Reason for visit      1. Osteoporosis    2. Osteopenia        History     Cinthya Higgins is a very pleasant 61 y.o. old female who presents for follow up.   SUMMARY:  Cinthya is here today by her PCP after a Dexa Scan showed that she had Osteoporosis.  There is a familial hx in her mother and sister.  She does follow a Celiac diet more or less because she feels better while following it.  She has had no breast cancer or treatment for the same.  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  She is doing Yoga 5 times a week and she does weight lifting when she can.  She is taking Calcium and Vit D in supplementation.  Both levels are on the low end of normal.  She feels well and has no hx of fracture    Dexa Scan    . The spine bone density reveals low bone mass at L1-L4 with T-score of   -1.1.  2. Femoral bone densities show osteoporosis with a left femoral neck T-   score of -2.8, and right femoral neck T-score of -2.9.  3.  Since the scan in 2010, bone density has declined a significant 11.6%   in the left total hip, and 12.2% on the right.  At L1-L4 of the AP spine,   a decline of 18.9% is noted.    Risk Factors     The following high- risk conditions have been ruled out: celiac disease, eating disorders, gastric bypass, hyperparathyroidism, inflammatory bowel disease, hyperthyroidism, rheumatoid arthritis, lupus, chronic kidney disease.    Cinthya Higgins has the following risk factors: Age, Female gender, , Low BMI and Family history of osteoporosis    She is not on high risk medications such as glucocorticoids, anti-coagulants, anti-convulsants, chemotherapy or levothyroxine.    Patient deniesHysterectomy, Oophrectomy, Breast cancer and Family history of breast cancer.     Menopause was at age: 51 years.     Past Medical History     Patient Active Problem List   Diagnosis     Nontoxic Multinodular Goiter     Osteopenia     Allergic Rhinitis     Cervical Polyps     Osteoporosis       Family History       family history is negative for Breast cancer.    Social History      reports that she has never smoked. She has never used smokeless tobacco.      Review of Systems     Patient denies current pain, limited mobility, fractures.   Remainder per HPI.      Vital Signs     /78  Pulse 74  Wt 125 lb (56.7 kg)  BMI 21.62 kg/m2    Physical Exam     GENERAL:  Normal, NIRD  EYES:  Pupils equal, round and reactive to light; no proptosis, lid lag or  periorbital edema.  THYROID:  Thyroid is normal.  No tenderness or bruit  NECK: No lymph nodes  MUSCULOSKELETAL: No joint abnormalities, FROM in all four extremities. No kyphosis. Muscle strength grossly normal without evidence of wasting.  HEART:  Regular rate and rhythm without murmur.  LUNGS:  Clear to auscultation.  ABDOMEN:Soft, non-tender, no masses or organomegaly  NEURO:  Patella Reflexes were normal.No tremors  SKIN:  No acanthosis nigricans or vitiligo        Assessment     1. Osteoporosis    2. Osteopenia        Plan     Cinthya is extremely reluctant to start any medication for Osteoporosis. Certainly, the Dexa Scan shows a decline in her bone health over the last 7 years, and realistically, that would be expected with age.  This is part of her reasoning process in not wanting to start any therapy.  We discussed at length, the good possibility of injury should she have a fall, given her T scores. We discussed possible treatment, especially starting with a Bisphosphonate and that she would likely slow the process, but again, she is not wanting to start any medication at this time.  We will get another Dexa Scan done in a year to better see her declination of bone health over a much shorter time period.      Total visit  minutes:25  Time spent counseling and coordination of care:23    Sabrina Leger  [facility]  6/16/2017  11:49 AM      Current Medications     Outpatient Medications Prior to Visit   Medication Sig Dispense Refill     calcium-vitamin D (CALCIUM-VITAMIN D) 500 mg(1,250mg) -200 unit per tablet Take 1 tablet by mouth daily.       fluticasone (FLONASE) 50 mcg/actuation nasal spray 2 squirts in each nostril daily 18 g 11     omega-3 fatty acids-vitamin E (FISH OIL) 1,000 mg cap Take 1 capsule by mouth daily.       azelastine (ASTELIN) 137 mcg (0.1 %) nasal spray 2 sprays into each nostril 2 (two) times a day. Use in each nostril as directed 30 mL 11     loratadine (CLARITIN) 10 mg tablet Take 10 mg by mouth daily.       No facility-administered medications prior to visit.          Lab Results     TSH   Date Value Ref Range Status   11/08/2010 2.3 0.3 - 5.0 uIU/mL Final     PTH   Date Value Ref Range Status   12/09/2009 50 16 - 73 pg/mL Final     Calcium   Date Value Ref Range Status   05/30/2017 8.8 8.5 - 10.5 mg/dL Final     Phosphorus   Date Value Ref Range Status   12/09/2009 3.7 2.5 - 4.5 mg/dL Final           Imaging Results   Last DEXA scan:  Results for orders placed in visit on 04/21/17   DXA Bone Density Scan    Narrative 4/21/2017      RE: Cinthya Higgins  YOB: 1955        Dear Lucia Hung,    Patient Profile:  61 y.o. female, postmenopausal, is here for the follow up bone density   test.   History of fractures - None. Family history of osteoporosis - Yes;    mother.  Family history of hip fracture: None. Smoking history - No.   Osteoporosis treatment past -  No. Osteoporosis treatment current - No.    Chronic medical problems - Celiac sprue (wheat intolerance) and History of   kidney stones. High risk medications -  None.    Assessment:    1. The spine bone density reveals low bone mass at L1-L4 with T-score of   -1.1.  2. Femoral bone densities show osteoporosis with a left  femoral neck T-   score of -2.8, and right femoral neck T-score of -2.9.  3.  Since the scan in 2010, bone density has declined a significant 11.6%   in the left total hip, and 12.2% on the right.  At L1-L4 of the AP spine,   a decline of 18.9% is noted.    61 y.o. female with OSTEOPOROSIS and HIGH predicted fracture risk for age   based on measured bone density.      Recommendations:  Further evaluation and therapeutic intervention is advised with a recheck   in 1-2 years.      Bone densitometry was performed on your patient using our [employer] iDXA   densitometer. The results are summarized and a copy of the actual scans   are included for your review. In conformity with the International Society   of Clinical Densitometry's most recent position statement for DXA   interpretation (2015), the diagnosis will be made on the lowest measured   T-score of the lumbar spine, femoral neck, total proximal femur or 33%   radius. Note the change in terminology for diagnostic classification from   OSTEOPENIA to LOW BONE MASS. All trending for sequential exams will be   done using multiple vertebrae or the total proximal femur. Fracture risk   is based on the WHO Fracture Risk Assessment Tool (FRAX). If additional   information is needed or if you would like to discuss the results, please   do not hesitate to call me.       Thank you for referring this patient to [facility].   We are happy to be of service in support of you and your practice. If you   have any questions or suggestions to improve our service, please call me   at [phone].     Sincerely,     Walter Storey M.D. SHAKIRACJILLIAN.  Osteoporosis Services, [facility]

## 2021-06-12 NOTE — PROGRESS NOTES
ASSESSMENT:   Erythema migrans and clinical lyme disease.  No systemic symptoms.  Will treat with doxycycline BID for 14 days.    PLAN:  Treat as above    Arely Patel MD    SUBJECTIVE:   Cinthya Higgins is a 61 y.o. female presents today, with concerns of a bullseye rash.  She has been up at the [facility] for 7 days.  She has not had any known tick bites but her  had a tick on him. She has never had lyme disease but has had antibiotics for prevention, apparently.  This was many years ago.   .      Patient Active Problem List   Diagnosis     Nontoxic Multinodular Goiter     Osteopenia     Allergic Rhinitis     Cervical Polyps     Osteoporosis       History   Smoking Status     Never Smoker   Smokeless Tobacco     Never Used       Current Medications:  Current Outpatient Prescriptions on File Prior to Visit   Medication Sig Dispense Refill     calcium-vitamin D (CALCIUM-VITAMIN D) 500 mg(1,250mg) -200 unit per tablet Take 1 tablet by mouth daily.       cephalexin (KEFLEX) 500 MG capsule Take 1 po TID x 5 days 15 capsule 0     FA/MV,CA,IRON,MIN/LYCOPENE/LUT (MULTIVITAL ORAL) Take by mouth.       fluticasone (FLONASE) 50 mcg/actuation nasal spray 2 squirts in each nostril daily 18 g 11     omega-3 fatty acids-vitamin E (FISH OIL) 1,000 mg cap Take 1 capsule by mouth daily.       No current facility-administered medications on file prior to visit.        Allergies:   Allergies   Allergen Reactions     Ampicillin        OBJECTIVE:   Vitals:    07/28/17 1613   BP: 120/72   Pulse: 86   Resp: 16   Temp: 98.1  F (36.7  C)   TempSrc: Oral   SpO2: 100%   Weight: 126 lb 3.2 oz (57.2 kg)     Physical exam reveals a 61 y.o. female.   Appears healthy, alert and cooperative.  Skin: pink, warm, dry. Patient with 7cm erythematous round patch with central clearing and an area looking like a bite in the center.

## 2021-07-07 ENCOUNTER — OFFICE VISIT (OUTPATIENT)
Dept: URGENT CARE | Facility: URGENT CARE | Age: 66
End: 2021-07-07
Payer: COMMERCIAL

## 2021-07-07 VITALS
OXYGEN SATURATION: 100 % | WEIGHT: 128 LBS | DIASTOLIC BLOOD PRESSURE: 60 MMHG | TEMPERATURE: 98.3 F | HEART RATE: 79 BPM | SYSTOLIC BLOOD PRESSURE: 100 MMHG | BODY MASS INDEX: 22.14 KG/M2

## 2021-07-07 DIAGNOSIS — A69.20 ERYTHEMA MIGRANS (LYME DISEASE): Primary | ICD-10-CM

## 2021-07-07 PROCEDURE — 99213 OFFICE O/P EST LOW 20 MIN: CPT | Performed by: STUDENT IN AN ORGANIZED HEALTH CARE EDUCATION/TRAINING PROGRAM

## 2021-07-07 RX ORDER — DOXYCYCLINE HYCLATE 100 MG
100 TABLET ORAL 2 TIMES DAILY
Qty: 20 TABLET | Refills: 0 | Status: SHIPPED | OUTPATIENT
Start: 2021-07-07 | End: 2021-07-17

## 2021-07-07 NOTE — PROGRESS NOTES
Assessment & Plan     Erythema migrans (Lyme disease)  - doxycycline hyclate (VIBRA-TABS) 100 MG tablet  Dispense: 20 tablet; Refill: 0           Return if symptoms worsen or fail to improve.    Tamara Machado MD  [facility] [place]        Vlad     Pat is a 65 year old female who presents to clinic today for the following health issues:  Chief Complaint   Patient presents with     Urgent Care     Tick Bite     c/o tick bite for 5 days     HPI  Bite  Onset of symptoms was 5 day(s) ago.  Course of illness is worsening.    Severity mild  Location: under right breast  Context: was camping in [place] over July 4th holiday weekend and noticed tick bite; did not see tick  Current and Associated symptoms: itching and redness  Treatment measures tried include: nothing  Significant past medical history: history of prior reactions  No chest pain, palpitations, trouble breathing, joint pains, weakness    Review of Systems  Constitutional, cardiovascular, pulmonary, derm, msk systems are negative, except as otherwise noted.      Objective    /60   Pulse 79   Temp 98.3  F (36.8  C) (Oral)   Wt 58.1 kg (128 lb)   LMP 08/08/2004   SpO2 100%   BMI 22.14 kg/m    Physical Exam   GEN: Alert and appropriately interactive for age  EYES: Eyes grossly normal to inspection, conjunctivae and sclerae normal  RESP: Breathing comfortably on room air   CV: Warm and well perfused peripheral extremities   MS: no gross musculoskeletal defects noted, no edema  NEURO: Alert and oriented. Gait normal. Speech fluent.    PSYCH: Affect normal/bright. Appearance well groomed.    SKIN: target shaped pink/red patch on right side under right breast

## 2021-07-07 NOTE — PATIENT INSTRUCTIONS
Patient Education     Lyme Disease  Lyme disease is caused by bacteria. The infection is most often passed during the bite of a deer tick. The tick is very small, so many people with Lyme disease don't know they have been bitten. Tests for Lyme disease are not always accurate early in the disease. If the disease is suspected, treatment may start before testing confirms the infection. A long course of antibiotics is the standard treatment.  If untreated, Lyme disease can cause symptoms in many parts of the body that may worsen.    Early symptoms limited to a small area may appear within a few days to a month after the tick bite. These symptoms may include a round, red rash that sometimes looks like a bull's-eye target with darker outer ring and a darker center. There may fever, chills, fatigue, body aches, and headache. In time, the rash goes away, even without treatment. That doesn't mean the infection has gone away, however. In some cases, early local symptoms never develop.    Early body-wide symptoms may appear weeks to months after the bite. These can include rashes on the skin of various parts of the body, muscle aches, fatigue, fever, headache, stiff neck, weakness on one side of the face, dizziness, palpitations, passing out, and joint pain and swelling.    Late-stage symptoms can include weakness in an arm, or leg, headache, fever, and numbness and tingling in the arms or legs, joint pain and swelling, confusion, and memory loss.    Many people will have left over symptoms even after treatment and cure of the Lyme disease. These are called post-Lyme symptoms and may include fatigue, body aches, joint aches, and headaches, which generally improve with time. Repeated courses of antibiotics don't help these symptoms to resolve faster. And, having the symptoms after a course of treatment does not mean that the Lyme bacteria is still active in the body.  Testing is done to check for the bacteria. When the  infection is treated early, it can be cured. In some cases, a second or third course of antibiotics may be needed. Be sure to follow your healthcare providers directions about treatment.  Home care  If antibiotic pills have been prescribed, take them exactly as directed until they are completely gone. Don't stop taking them until you have taken the full course or your healthcare provider has told you to stop.  Ask your healthcare provider about taking over-the-counter medicines to control symptoms such as aches and fever.  Follow-up care  Follow up with your healthcare provider, or as advised. Be sure to return for follow-up testing as directed to be sure the infection has been treated.  When to seek medical advice  Call your healthcare provider right away if any of the following occur:    Current symptoms get worse    Unexplained fever, neck pain or stiffness, or headache    Arm, leg or facial weakness    Joint pain or swelling    Numbness and tingling in the arms or legs    Confusion or memory loss    Irregular or rapid heartbeat, palpitations, dizziness, or passing out

## 2021-07-22 ENCOUNTER — RECORDS - HEALTHEAST (OUTPATIENT)
Dept: FAMILY MEDICINE | Facility: CLINIC | Age: 66
End: 2021-07-22

## 2021-07-22 DIAGNOSIS — Z12.31 OTHER SCREENING MAMMOGRAM: ICD-10-CM

## 2021-08-03 ENCOUNTER — OFFICE VISIT (OUTPATIENT)
Dept: URGENT CARE | Facility: URGENT CARE | Age: 66
End: 2021-08-03
Payer: COMMERCIAL

## 2021-08-03 VITALS
WEIGHT: 128 LBS | HEART RATE: 93 BPM | OXYGEN SATURATION: 100 % | DIASTOLIC BLOOD PRESSURE: 60 MMHG | BODY MASS INDEX: 22.14 KG/M2 | SYSTOLIC BLOOD PRESSURE: 112 MMHG | TEMPERATURE: 98.9 F
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
## 2021-08-03 DIAGNOSIS — A69.20 ERYTHEMA MIGRANS (LYME DISEASE): Primary | ICD-10-CM

## 2021-08-03 DIAGNOSIS — S30.861A TICK BITE OF WAISTBAND REGION, INITIAL ENCOUNTER: ICD-10-CM

## 2021-08-03 PROCEDURE — 99214 OFFICE O/P EST MOD 30 MIN: CPT | Performed by: FAMILY MEDICINE

## 2021-08-03 RX ORDER — DOXYCYCLINE 100 MG/1
100 CAPSULE ORAL 2 TIMES DAILY
Qty: 20 CAPSULE | Refills: 0 | Status: SHIPPED | OUTPATIENT
Start: 2021-08-03 | End: 2021-08-13

## 2021-08-03 NOTE — PROGRESS NOTES
Chief Complaint   Patient presents with     Urgent Care     Insect Bites     c/o insect bite for 1 week       Medical Decision Making:    ASSESMENT AND PLAN   Cinthya was seen today for urgent care and insect bites.    Diagnoses and all orders for this visit:    Erythema migrans (Lyme disease)  -     doxycycline hyclate (VIBRAMYCIN) 100 MG capsule; Take 1 capsule (100 mg) by mouth 2 times daily for 10 days    Tick bite of waistband region, initial encounter    Discussed with patient with the findings advised to start doing antibiotic as directed.  If notices any new symptoms such as worsening fatigue tiredness body ache should follow-up for further evaluation and treatment.       I have reviewed the nursing notes.    Differential Diagnosis:  Insect Bite: Insect sting, Spider bite, Mosquito bite, Deer tick bite, Woodtick bite, Cellulitis, Exagerated local reaction to bite, Hives and Urticaria      Time  spent on the date of the encounter doing chart review, patient visit and documentation   see orders in Epic  Pt verbalized and agreed with the plan and is aware of the worsening symptoms for which would need to follow up .  Pt was stable during time of discharge from the clinic     SUBJECTIVE     Cinthya Higgins is a 65 year old female presenting with a chief complaint of    Chief Complaint   Patient presents with     Urgent Care     Insect Bites     c/o insect bite for 1 week           Cinthya Higgins is a 65 year old female presenting with a chief complaint of  Rash in the left waist area   She was out hiking not sure a week back and not sure about a tick bite but then yesterday noticed a target lesion over the left waist area.  She did wear bug repellent when was outdoor.  Patient denies any other systemic symptoms. She is an established patient of [facility].  Onset of symptoms was 7 day(s) ago.  Course of illness is same.    Severity moderate  Current and Associated symptoms: rash  Treatment measures tried  include None tried.  Predisposing factors include tick bite .    History reviewed. No pertinent past medical history.  Current Outpatient Medications   Medication Sig Dispense Refill     doxycycline hyclate (VIBRAMYCIN) 100 MG capsule Take 1 capsule (100 mg) by mouth 2 times daily for 10 days 20 capsule 0     Social History     Tobacco Use     Smoking status: Never Smoker     Smokeless tobacco: Never Used   Substance Use Topics     Alcohol use: Yes     Comment: infrequent glass of wine or beer     Family History   Problem Relation Age of Onset     Diabetes Father      Arthritis Father      Hypertension Mother      Dementia Mother      Cancer Paternal Grandfather         lung cancer     Cancer Maternal Grandfather         luekemia     Alzheimer Disease Paternal Grandmother      Neurologic Disorder Maternal Grandmother         parkinson's disease     Breast Cancer No family hx of          ROS:    10 point ROS of systems including Constitutional, Eyes, Respiratory, Cardiovascular, Gastroenterology, Genitourinary, Muscularskeletal, Psychiatric ,neurological were all negative except for pertinent positives noted in my HPI         OBJECTIVE:    /60   Pulse 93   Temp 98.9  F (37.2  C) (Oral)   Wt 58.1 kg (128 lb)   LMP 08/08/2004   SpO2 100%   BMI 22.14 kg/m    GENERAL APPEARANCE: healthy, alert and no distress  EYES: EOMI,  PERRL, conjunctiva clear  HENT: ear canals and TM's normal.  Nose and mouth without ulcers, erythema or lesions  NECK: supple, nontender, no lymphadenopathy  RESP: lungs clear to auscultation - no rales, rhonchi or wheezes  CV: regular rates and rhythm, normal S1 S2, no murmur noted  SKIN: Target rash noted over the left waist area which is pink/red in color with a prominent border around it           PSYCH: mentation appears normal  Physical Exam      (Note was completed, in part, with [employer] voice-recognition software. Documentation reviewed, but some grammatical, spelling, and word  errors may remain.)  Laure Arredondo MD on 8/3/2021 at 11:52 AM

## 2021-09-19 ENCOUNTER — TRANSFERRED RECORDS (OUTPATIENT)
Dept: HEALTH INFORMATION MANAGEMENT | Facility: CLINIC | Age: 66
End: 2021-09-19

## 2021-10-23 ENCOUNTER — HEALTH MAINTENANCE LETTER (OUTPATIENT)
Age: 66
End: 2021-10-23

## 2021-11-17 PROBLEM — S49.91XA SHOULDER INJURY, RIGHT, INITIAL ENCOUNTER: Status: RESOLVED | Noted: 2021-05-07 | Resolved: 2021-11-17

## 2022-05-31 ENCOUNTER — ANCILLARY PROCEDURE (OUTPATIENT)
Dept: MAMMOGRAPHY | Facility: CLINIC | Age: 67
End: 2022-05-31
Attending: NURSE PRACTITIONER
Payer: COMMERCIAL

## 2022-05-31 DIAGNOSIS — Z12.31 VISIT FOR SCREENING MAMMOGRAM: ICD-10-CM

## 2022-05-31 PROCEDURE — 77067 SCR MAMMO BI INCL CAD: CPT | Mod: 26 | Performed by: RADIOLOGY

## 2022-05-31 PROCEDURE — 77067 SCR MAMMO BI INCL CAD: CPT

## 2022-06-08 ENCOUNTER — TRANSFERRED RECORDS (OUTPATIENT)
Dept: FAMILY MEDICINE | Facility: CLINIC | Age: 67
End: 2022-06-08
Payer: COMMERCIAL

## 2022-06-20 ASSESSMENT — ENCOUNTER SYMPTOMS
HEMATURIA: 0
NAUSEA: 0
ABDOMINAL PAIN: 0
FEVER: 0
MYALGIAS: 0
DIARRHEA: 0
PARESTHESIAS: 0
FREQUENCY: 0
WEAKNESS: 0
DIZZINESS: 0
HEMATOCHEZIA: 0
BREAST MASS: 0
EYE PAIN: 0
CHILLS: 0
HEADACHES: 0
COUGH: 0
ARTHRALGIAS: 0
SHORTNESS OF BREATH: 0
DYSURIA: 0
HEARTBURN: 0
CONSTIPATION: 0
SORE THROAT: 0
JOINT SWELLING: 0
NERVOUS/ANXIOUS: 0
PALPITATIONS: 0

## 2022-06-20 ASSESSMENT — ACTIVITIES OF DAILY LIVING (ADL): CURRENT_FUNCTION: NO ASSISTANCE NEEDED

## 2022-06-21 NOTE — PROGRESS NOTES
"  SUBJECTIVE:   Cinthya Higgins is a 66 year old female who presents for Preventive Visit.      Patient has been advised of split billing requirements and indicates understanding: Yes  Are you in the first 12 months of your Medicare Part B coverage?  Yes,  Visual Acuity:  Right Eye: 20/50   Left Eye: 20/50  Both Eyes: 20/50    Patient reports she hasn't been sleeping well the past couple nights due to the heat. Has not has caffeine this morning and is it not a morning person. Does not report memory issues.    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     Does stretching and core work every morning. Does cardio as well. Walking outside, lifting weights a couple times a week.     Tries MIND diet (similar to mediterranean diet).     Has living will (honoring choices) at home.         Answers for HPI/ROS submitted by the patient on 6/20/2022  In general, how would you rate your overall physical health?: good  Frequency of exercise:: 6-7 days/week  Do you usually eat at least 4 servings of fruit and vegetables a day, include whole grains & fiber, and avoid regularly eating high fat or \"junk\" foods? : Yes  Taking medications regularly:: Yes  Medication side effects:: None  Activities of Daily Living: no assistance needed  Home safety: lack of grab bars in the bathroom  Hearing Impairment:: difficulty following a conversation in a noisy restaurant or crowded room  In the past 6 months, have you been bothered by leaking of urine?: No  abdominal pain: No  Blood in stool: No  Blood in urine: No  chest pain: No  chills: No  congestion: No  constipation: No  cough: No  diarrhea: No  dizziness: No  ear pain: No  eye pain: No  nervous/anxious: No  fever: No  frequency: No  genital sores: No  headaches: No  hearing loss: No  heartburn: No  arthralgias: No  joint swelling: No  peripheral edema: No  mood changes: No  myalgias: No  nausea: No  dysuria: " No  palpitations: No  Skin sensation changes: No  sore throat: No  urgency: No  rash: No  shortness of breath: No  visual disturbance: No  weakness: No  pelvic pain: No  vaginal bleeding: No  vaginal discharge: No  tenderness: No  breast mass: No  breast discharge: No  In general, how would you rate your overall mental or emotional health?: good  Additional concerns today:: No  Duration of exercise:: 15-30 minutes      PHQ-2 Score: (P) 0    Do you feel safe in your environment? Yes    Have you ever done Advance Care Planning? (For example, a Health Directive, POLST, or a discussion with a medical provider or your loved ones about your wishes): Yes, patient states has an Advance Care Planning document and will bring a copy to the clinic.    Additional concerns to address?  No    Fall risk:  Fallen 2 or more times in the past year?: No  Any fall with injury in the past year?: Yes  click delete button to remove this line now  Cognitive Screenin) Repeat 3 items (Leader, Season, Table)    2) Clock draw: NORMAL  3) 3 item recall: Recalls 3 objects  Results: 3 items recalled: COGNITIVE IMPAIRMENT LESS LIKELY    Mini-CogTM Copyright S Den. Licensed by the author for use in [facility]; reprinted with permission (sojam@.[email or web address]). All rights reserved.      Do you have sleep apnea, excessive snoring or daytime drowsiness?: might have them, not sure. Was told she snores      Reviewed and updated as needed this visit by clinical staff                    Reviewed and updated as needed this visit by Provider                   Social History     Tobacco Use     Smoking status: Never Smoker     Smokeless tobacco: Never Used   Substance Use Topics     Alcohol use: Yes     Comment: infrequent glass of wine or beer                           Current providers sharing in care for this patient include:   Patient Care Team:  Sarah Oates APRN CNP as PCP - General (Nurse Practitioner - Family)  Sarah Oates,  "APRN CNP as Assigned PCP  Nadia Olvera MD as Assigned OBGYN Provider    The following health maintenance items are reviewed in Epic and correct as of today:  Health Maintenance   Topic Date Due     ANNUAL REVIEW OF HM ORDERS  Never done     ADVANCE CARE PLANNING  Never done     HIV SCREENING  Never done     HEPATITIS C SCREENING  Never done     ZOSTER IMMUNIZATION (1 of 2) Never done     Pneumococcal Vaccine: 65+ Years (1 - PCV) Never done     LIPID  04/07/2022     MEDICARE ANNUAL WELLNESS VISIT  06/22/2023     FALL RISK ASSESSMENT  06/22/2023     MAMMO SCREENING  05/31/2024     COLORECTAL CANCER SCREENING  09/09/2029     DTAP/TDAP/TD IMMUNIZATION (5 - Td or Tdap) 05/05/2031     DEXA  04/21/2032     PHQ-2 (once per calendar year)  Completed     INFLUENZA VACCINE  Completed     COVID-19 Vaccine  Completed     IPV IMMUNIZATION  Aged Out     MENINGITIS IMMUNIZATION  Aged Out     HEPATITIS B IMMUNIZATION  Aged Out     Lab work is in process  Labs reviewed in Epic    Pneumonia Vaccine:For adults 65 years or older who do not have an immunocompromising condition, cerebrospinal fluid leak, or cochlear implant and want to receive PCV13 AND PPSV23: Administer 1 dose of PCV13 first then give 1 dose of PPSV23 at least 1 year later. If the patient already received PPSV23, give the dose of PCV13 at least 1 year after they received the most recent dose of PPSV23. Anyone who received any doses of PPSV23 before age 65 should receive 1 final dose of the vaccine at age 65 or older. Administer this last dose at least 5 years after the prior PPSV23 dose.  Mammogram Screening: Mammogram Screening: Recommended mammography every 1-2 years with patient discussion and risk factor consideration    ROS:  Constitutional, HEENT, cardiovascular, pulmonary, gi and gu systems are negative, except as otherwise noted.    OBJECTIVE:   /86   Pulse 90   Temp 97.7  F (36.5  C) (Temporal)   Resp 14   Ht 1.64 m (5' 4.57\")   Wt 57.2 kg " "(126 lb)   LMP 08/08/2004   SpO2 99%   BMI 21.25 kg/m   Estimated body mass index is 22.14 kg/m  as calculated from the following:    Height as of 5/5/21: 1.619 m (5' 3.75\").    Weight as of 8/3/21: 58.1 kg (128 lb).  EXAM:   GENERAL: healthy, alert and no distress  EYES: Eyes grossly normal to inspection, PERRL and conjunctivae and sclerae normal  HENT: ear canals and TM's normal, nose and mouth without ulcers or lesions. Moderate cerumen in right ear.   NECK: no adenopathy, no asymmetry, masses, or scars and thyroid normal to palpation  RESP: lungs clear to auscultation - no rales, rhonchi or wheezes  CV: regular rate and rhythm, normal S1 S2, no S3 or S4, no murmur, click or rub, no peripheral edema and peripheral pulses strong  ABDOMEN: soft, nontender, no hepatosplenomegaly, no masses and bowel sounds normal  MS: no gross musculoskeletal defects noted, no edema  SKIN: no suspicious lesions or rashes  PSYCH: mentation appears normal, affect normal/bright    Diagnostic Test Results:  Labs reviewed in Epic    ASSESSMENT / PLAN:   1. Welcome to Medicare preventive visit  Healthy, schedule a follow up if needed for anxiety and panic  2. Need for hepatitis C screening test  - Hepatitis C Screen Reflex to HCV RNA Quant and Genotype; Future    3. Screening for hyperlipidemia  - Lipid panel reflex to direct LDL Fasting; Future    4. Screening for diabetes mellitus  - Glucose; Future    5. Need for pneumococcal vaccine  - Pneumococcal 20 Valent Conjugate (PCV20)    COUNSELING:  Reviewed preventive health counseling, as reflected in patient instructions    Estimated body mass index is 22.14 kg/m  as calculated from the following:    Height as of 5/5/21: 1.619 m (5' 3.75\").    Weight as of 8/3/21: 58.1 kg (128 lb).        She reports that she has never smoked. She has never used smokeless tobacco.    Appropriate preventive services were discussed with this patient, including applicable screening as appropriate for " cardiovascular disease, diabetes, osteopenia/osteoporosis, and glaucoma.  As appropriate for age/gender, discussed screening for colorectal cancer, prostate cancer, breast cancer, and cervical cancer. Checklist reviewing preventive services available has been given to the patient.    Reviewed patients plan of care and provided an AVS. The Basic Care Plan (routine screening as documented in Health Maintenance) for Cinthya meets the Care Plan requirement. This Care Plan has been established and reviewed with the Patient.    Counseling Resources:  ATP IV Guidelines  Pooled Cohorts Equation Calculator  Breast Cancer Risk Calculator  BRCA-Related Cancer Risk Assessment: FHS-7 Tool  FRAX Risk Assessment  ICSI Preventive Guidelines  Dietary Guidelines for Americans, 2010  USDA's MyPlate  ASA Prophylaxis  Lung CA Screening    This patient was seen along with, Anita Liu-student, history and review of systems obtained by student and confirmed by attending. Objective, exams, assessment and plan reviewed with attending.     MERT Davis Student       CAMILLE Uriarte [facility]

## 2022-06-21 NOTE — PATIENT INSTRUCTIONS
Patient Education   Personalized Prevention Plan  You are due for the preventive services outlined below.  Your care team is available to assist you in scheduling these services.  If you have already completed any of these items, please share that information with your care team to update in your medical record.  Health Maintenance Due   Topic Date Due     ANNUAL REVIEW OF HM ORDERS  Never done     Discuss Advance Care Planning  Never done     HIV Screening  Never done     Hepatitis C Screening  Never done     Zoster (Shingles) Vaccine (1 of 2) Never done     Pneumococcal Vaccine (1 - PCV) Never done     Cholesterol Lab  04/07/2022

## 2022-06-22 ENCOUNTER — OFFICE VISIT (OUTPATIENT)
Dept: FAMILY MEDICINE | Facility: CLINIC | Age: 67
End: 2022-06-22
Payer: COMMERCIAL

## 2022-06-22 VITALS
SYSTOLIC BLOOD PRESSURE: 128 MMHG | HEIGHT: 65 IN | DIASTOLIC BLOOD PRESSURE: 86 MMHG | WEIGHT: 126 LBS | OXYGEN SATURATION: 99 % | RESPIRATION RATE: 14 BRPM | TEMPERATURE: 97.7 F | BODY MASS INDEX: 20.99 KG/M2 | HEART RATE: 90 BPM

## 2022-06-22 DIAGNOSIS — Z13.1 SCREENING FOR DIABETES MELLITUS: ICD-10-CM

## 2022-06-22 DIAGNOSIS — Z11.59 NEED FOR HEPATITIS C SCREENING TEST: ICD-10-CM

## 2022-06-22 DIAGNOSIS — Z13.220 SCREENING FOR HYPERLIPIDEMIA: ICD-10-CM

## 2022-06-22 DIAGNOSIS — Z00.00 WELCOME TO MEDICARE PREVENTIVE VISIT: Primary | ICD-10-CM

## 2022-06-22 DIAGNOSIS — Z23 NEED FOR PNEUMOCOCCAL VACCINE: ICD-10-CM

## 2022-06-22 PROCEDURE — G0438 PPPS, INITIAL VISIT: HCPCS | Performed by: NURSE PRACTITIONER

## 2022-06-22 PROCEDURE — 36415 COLL VENOUS BLD VENIPUNCTURE: CPT | Performed by: NURSE PRACTITIONER

## 2022-06-22 PROCEDURE — 90471 IMMUNIZATION ADMIN: CPT | Performed by: NURSE PRACTITIONER

## 2022-06-22 PROCEDURE — 80061 LIPID PANEL: CPT | Performed by: NURSE PRACTITIONER

## 2022-06-22 PROCEDURE — 82947 ASSAY GLUCOSE BLOOD QUANT: CPT | Performed by: NURSE PRACTITIONER

## 2022-06-22 PROCEDURE — 90677 PCV20 VACCINE IM: CPT | Performed by: NURSE PRACTITIONER

## 2022-06-22 PROCEDURE — 86803 HEPATITIS C AB TEST: CPT | Performed by: NURSE PRACTITIONER

## 2022-06-22 RX ORDER — CHLORAL HYDRATE 500 MG
2 CAPSULE ORAL DAILY
COMMUNITY
End: 2022-11-10

## 2022-06-22 RX ORDER — MULTIPLE VITAMINS W/ MINERALS TAB 9MG-400MCG
1 TAB ORAL DAILY
COMMUNITY
End: 2022-11-10

## 2022-06-22 RX ORDER — FLUTICASONE PROPIONATE 50 MCG
1 SPRAY, SUSPENSION (ML) NASAL DAILY
COMMUNITY
End: 2022-11-10

## 2022-06-23 LAB
CHOLEST SERPL-MCNC: 235 MG/DL
FASTING STATUS PATIENT QL REPORTED: YES
FASTING STATUS PATIENT QL REPORTED: YES
GLUCOSE BLD-MCNC: 96 MG/DL (ref 70–99)
HCV AB SERPL QL IA: NONREACTIVE
HDLC SERPL-MCNC: 113 MG/DL
LDLC SERPL CALC-MCNC: 109 MG/DL
NONHDLC SERPL-MCNC: 122 MG/DL
TRIGL SERPL-MCNC: 64 MG/DL

## 2022-10-10 ENCOUNTER — HEALTH MAINTENANCE LETTER (OUTPATIENT)
Age: 67
End: 2022-10-10

## 2022-10-20 ENCOUNTER — TELEPHONE (OUTPATIENT)
Dept: FAMILY MEDICINE | Facility: CLINIC | Age: 67
End: 2022-10-20

## 2022-11-10 ENCOUNTER — OFFICE VISIT (OUTPATIENT)
Dept: FAMILY MEDICINE | Facility: CLINIC | Age: 67
End: 2022-11-10
Payer: COMMERCIAL

## 2022-11-10 VITALS
HEIGHT: 63 IN | DIASTOLIC BLOOD PRESSURE: 80 MMHG | HEART RATE: 105 BPM | OXYGEN SATURATION: 99 % | WEIGHT: 127 LBS | RESPIRATION RATE: 15 BRPM | BODY MASS INDEX: 22.5 KG/M2 | TEMPERATURE: 97.5 F | SYSTOLIC BLOOD PRESSURE: 146 MMHG

## 2022-11-10 DIAGNOSIS — Z01.818 PRE-OP EXAM: Primary | ICD-10-CM

## 2022-11-10 DIAGNOSIS — K62.89 MASS OF ANUS: ICD-10-CM

## 2022-11-10 PROBLEM — M89.9 DISORDER OF BONE AND CARTILAGE: Status: ACTIVE | Noted: 2022-11-10

## 2022-11-10 PROBLEM — N84.1 MUCOUS POLYP OF CERVIX: Status: ACTIVE | Noted: 2022-11-10

## 2022-11-10 PROBLEM — M94.9 DISORDER OF BONE AND CARTILAGE: Status: ACTIVE | Noted: 2022-11-10

## 2022-11-10 PROBLEM — E04.2 NONTOXIC MULTINODULAR GOITER: Status: ACTIVE | Noted: 2022-11-10

## 2022-11-10 PROBLEM — J30.9 ALLERGIC RHINITIS: Status: ACTIVE | Noted: 2022-11-10

## 2022-11-10 PROCEDURE — 99214 OFFICE O/P EST MOD 30 MIN: CPT | Performed by: PHYSICIAN ASSISTANT

## 2022-11-10 ASSESSMENT — PAIN SCALES - GENERAL: PAINLEVEL: NO PAIN (0)

## 2022-11-10 NOTE — LETTER
11/10/2022        RE: Cinthya Higgins  [address]  [facility] [address]        [facility] [address] [place] [address]  Phone: [phone]  Primary Provider: Sarah Oates  Pre-op Performing Provider: TRACEY BARTH    PREOPERATIVE EVALUATION:  Today's date: 11/10/2022    Cinthya Higgins is a 67 year old female who presents for a preoperative evaluation.    Surgical Information:  Surgery/Procedure: Anorectal Surgery   Surgery Location: [facility]   Surgeon: Lebron Gross MD  Surgery Date: 11/14/2022  Time of Surgery: 7:15am  Where patient plans to recover: At home with family  Fax number for surgical facility: [phone]    Type of Anesthesia Anticipated: General    Assessment & Plan     The proposed surgical procedure is considered LOW risk.    Pre-op exam  Mass of anus -  Planned excisional biopsy, cleared for surgery.     Possible Sleep Apnea: does not meet criteria for sleep study. Monitor for hypoventilation during procedure.      Risks and Recommendations:  The patient has the following additional risks and recommendations for perioperative complications:   - No identified additional risk factors other than previously addressed    Medication Instructions:  Patient is on no chronic medications    RECOMMENDATION:  APPROVAL GIVEN to proceed with proposed procedure, without further diagnostic evaluation.    Subjective     HPI related to upcoming procedure: h/o condylomas in the 70's, removed  Then returned in 2019, removed  Has been monitoring since then and had recurrence, tried TCA treatment but ineffective - anal pap was WNL, however, given ineffective TCA treatment they have planned excisional biopsy    Preop Questions 11/3/2022   1. Have you ever had a heart attack or stroke? No   2. Have you ever had surgery on your heart or blood vessels, such as a stent placement, a coronary artery bypass, or surgery on an artery  in your head, neck, heart, or legs? No   3. Do you have chest pain with activity? No   4. Do you have a history of  heart failure? No   5. Do you currently have a cold, bronchitis or symptoms of other infection? No   6. Do you have a cough, shortness of breath, or wheezing? No   7. Do you or anyone in your family have previous history of blood clots? No   8. Do you or does anyone in your family have a serious bleeding problem such as prolonged bleeding following surgeries or cuts? No   9. Have you ever had problems with anemia or been told to take iron pills? No   10. Have you had any abnormal blood loss such as black, tarry or bloody stools, or abnormal vaginal bleeding? No   11. Have you ever had a blood transfusion? No   12. Are you willing to have a blood transfusion if it is medically needed before, during, or after your surgery? Yes   13. Have you or any of your relatives ever had problems with anesthesia? No   14. Do you have sleep apnea, excessive snoring or daytime drowsiness? UNKNOWN   15. Do you have any artifical heart valves or other implanted medical devices like a pacemaker, defibrillator, or continuous glucose monitor? No   16. Do you have artificial joints? No   17. Are you allergic to latex? No       Health Care Directive:  Patient does not have a Health Care Directive or Living Will: Patient states has Advance Directive and will bring in a copy to clinic.    Preoperative Review of :   reviewed - no record of controlled substances prescribed.    Review of Systems    Patient Active Problem List    Diagnosis Date Noted     Nontoxic multinodular goiter 11/10/2022     Priority: Medium     Formatting of this note might be different from the original.  Created by Conversion       Mucous polyp of cervix 11/10/2022     Priority: Medium     Formatting of this note might be different from the original.  Created by Conversion       Disorder of bone and cartilage 11/10/2022     Priority: Medium      Formatting of this note might be different from the original.  Created by Conversion    Replacement Utility updated for latest IMO load       Allergic rhinitis 11/10/2022     Priority: Medium     Formatting of this note might be different from the original.  Created by [employer] [facility] Annotation: Nov 8 2010  9:49PM - MelyJulisa: mild seasonal sx    Replacement Utility updated for latest IMO load       Cervical cancer screening 05/13/2021     Priority: Medium     No further cervical cancer screening recommended.     Pt age 65  Pt needs 3 consecutive negative pap tests or 2 consecutive negative cotests within 10 years with the last one being in the last 5 years, before pap screening can be discontinued.    No history of DILLON 2 or greater found in epic.   Pap history below.    04/7/17 NIL Pap, Neg HR HPV  05/6/21 NIL Pap, Neg HR HPV         CONTACT W OR EXPOSURE to asbestos 08/16/2004     Priority: Medium      History reviewed. No pertinent past medical history.  Past Surgical History:   Procedure Laterality Date     BREAST CYST ASPIRATION Left      COLONOSCOPY  2019     EYE SURGERY  1997    plugged tear duct     HC REMOVAL OF OVARIAN CYST(S)      Description: Ovarian Cystectomy;  Proc Date: 01/01/1980;     [facility]      Vag del x 2     ZZC NONSPECIFIC PROCEDURE      Ovarian cyst removed-benign     ZZ NONSPECIFIC PROCEDURE      Eye surgery     Z NONSPECIFIC PROCEDURE      Needle bx-(L) breast     ZZ NONSPECIFIC PROCEDURE      Endometrial bx     Current Outpatient Medications   Medication Sig Dispense Refill     fish oil-omega-3 fatty acids 1000 MG capsule Take 2 g by mouth daily (Patient not taking: Reported on 11/10/2022)       multivitamin w/minerals (THERA-VIT-M) tablet Take 1 tablet by mouth daily (Patient not taking: Reported on 11/10/2022)       Allergies   Allergen Reactions     Ampicillin       Social History     Tobacco Use     Smoking status: Never     Smokeless tobacco: Never  "  Substance Use Topics     Alcohol use: Yes     Comment: infrequent glass of wine or beer       History   Drug Use Unknown        Objective     BP (!) 146/80 (BP Location: Right arm, Patient Position: Chair, Cuff Size: Adult Regular)   Pulse 105   Temp 97.5  F (36.4  C) (Oral)   Resp 15   Ht 1.61 m (5' 3.39\")   Wt 57.6 kg (127 lb)   LMP 08/08/2004   SpO2 99%   BMI 22.22 kg/m      Physical Exam    GENERAL APPEARANCE: healthy, alert and no distress     EYES: EOMI, PERRL     HENT: ear canals and TM's normal and nose and mouth without ulcers or lesions     NECK: no adenopathy, no asymmetry, masses, or scars and thyroid normal to palpation     RESP: lungs clear to auscultation - no rales, rhonchi or wheezes     CV: regular rates and rhythm, normal S1 S2, no S3 or S4 and no murmur, click or rub     ABDOMEN:  soft, nontender, no HSM or masses and bowel sounds normal     MS: extremities normal- no gross deformities noted, no evidence of inflammation in joints, FROM in all extremities.     SKIN: no suspicious lesions or rashes     NEURO: Normal strength and tone, sensory exam grossly normal, mentation intact and speech normal     PSYCH: mentation appears normal. and affect normal/bright     LYMPHATICS: No cervical adenopathy    No results for input(s): HGB, PLT, INR, NA, POTASSIUM, CR, A1C in the last [id number] hours.     Diagnostics:  No labs were ordered during this visit.   No EKG required for low risk surgery (cataract, skin procedure, breast biopsy, etc).    Revised Cardiac Risk Index (RCRI):  The patient has the following serious cardiovascular risks for perioperative complications:   - No serious cardiac risks = 0 points     RCRI Interpretation: 0 points: Class I (very low risk - 0.4% complication rate)    Signed Electronically by: Venecia Torres PA-C  Copy of this evaluation report is provided to requesting physician.            Sincerely,        Venecia Torres PA-C    "

## 2022-11-10 NOTE — PROGRESS NOTES
[facility] [address] [place] [address]  Phone: [phone]  Primary Provider: Sarah Oates  Pre-op Performing Provider: TRACEY BARTH    PREOPERATIVE EVALUATION:  Today's date: 11/10/2022    Cinthya Higgins is a 67 year old female who presents for a preoperative evaluation.    Surgical Information:  Surgery/Procedure: Anorectal Surgery   Surgery Location: [facility]   Surgeon: Lebron Gross MD  Surgery Date: 11/14/2022  Time of Surgery: 7:15am  Where patient plans to recover: At home with family  Fax number for surgical facility: [phone]    Type of Anesthesia Anticipated: General    Assessment & Plan     The proposed surgical procedure is considered LOW risk.    Pre-op exam  Mass of anus -  Planned excisional biopsy, cleared for surgery.     Possible Sleep Apnea: does not meet criteria for sleep study. Monitor for hypoventilation during procedure.      Risks and Recommendations:  The patient has the following additional risks and recommendations for perioperative complications:   - No identified additional risk factors other than previously addressed    Medication Instructions:  Patient is on no chronic medications    RECOMMENDATION:  APPROVAL GIVEN to proceed with proposed procedure, without further diagnostic evaluation.    Subjective     HPI related to upcoming procedure: h/o condylomas in the 70's, removed  Then returned in 2019, removed  Has been monitoring since then and had recurrence, tried TCA treatment but ineffective - anal pap was WNL, however, given ineffective TCA treatment they have planned excisional biopsy    Preop Questions 11/3/2022   1. Have you ever had a heart attack or stroke? No   2. Have you ever had surgery on your heart or blood vessels, such as a stent placement, a coronary artery bypass, or surgery on an artery in your head, neck, heart, or legs? No   3. Do you have chest pain with activity? No   4. Do you  have a history of  heart failure? No   5. Do you currently have a cold, bronchitis or symptoms of other infection? No   6. Do you have a cough, shortness of breath, or wheezing? No   7. Do you or anyone in your family have previous history of blood clots? No   8. Do you or does anyone in your family have a serious bleeding problem such as prolonged bleeding following surgeries or cuts? No   9. Have you ever had problems with anemia or been told to take iron pills? No   10. Have you had any abnormal blood loss such as black, tarry or bloody stools, or abnormal vaginal bleeding? No   11. Have you ever had a blood transfusion? No   12. Are you willing to have a blood transfusion if it is medically needed before, during, or after your surgery? Yes   13. Have you or any of your relatives ever had problems with anesthesia? No   14. Do you have sleep apnea, excessive snoring or daytime drowsiness? UNKNOWN   15. Do you have any artifical heart valves or other implanted medical devices like a pacemaker, defibrillator, or continuous glucose monitor? No   16. Do you have artificial joints? No   17. Are you allergic to latex? No       Health Care Directive:  Patient does not have a Health Care Directive or Living Will: Patient states has Advance Directive and will bring in a copy to clinic.    Preoperative Review of :   reviewed - no record of controlled substances prescribed.    Review of Systems    Patient Active Problem List    Diagnosis Date Noted     Nontoxic multinodular goiter 11/10/2022     Priority: Medium     Formatting of this note might be different from the original.  Created by Conversion       Mucous polyp of cervix 11/10/2022     Priority: Medium     Formatting of this note might be different from the original.  Created by Conversion       Disorder of bone and cartilage 11/10/2022     Priority: Medium     Formatting of this note might be different from the original.  Created by Conversion    Replacement  Utility updated for latest IMO load       Allergic rhinitis 11/10/2022     Priority: Medium     Formatting of this note might be different from the original.  Created by [facility]  [facility] Annotation: Nov 8 2010  9:49PM - Julisa Boone: mild seasonal sx    Replacement Utility updated for latest IMO load       Cervical cancer screening 05/13/2021     Priority: Medium     No further cervical cancer screening recommended.     Pt age 65  Pt needs 3 consecutive negative pap tests or 2 consecutive negative cotests within 10 years with the last one being in the last 5 years, before pap screening can be discontinued.    No history of DILLON 2 or greater found in epic.   Pap history below.    04/7/17 NIL Pap, Neg HR HPV  05/6/21 NIL Pap, Neg HR HPV         CONTACT W OR EXPOSURE to asbestos 08/16/2004     Priority: Medium      History reviewed. No pertinent past medical history.  Past Surgical History:   Procedure Laterality Date     BREAST CYST ASPIRATION Left      COLONOSCOPY  2019     EYE SURGERY  1997    plugged tear duct     HC REMOVAL OF OVARIAN CYST(S)      Description: Ovarian Cystectomy;  Proc Date: 01/01/1980;     Z NONSPECIFIC PROCEDURE      Vag del x 2     ZZC NONSPECIFIC PROCEDURE      Ovarian cyst removed-benign     ZZC NONSPECIFIC PROCEDURE      Eye surgery     ZZC NONSPECIFIC PROCEDURE      Needle bx-(L) breast     ZZC NONSPECIFIC PROCEDURE      Endometrial bx     Current Outpatient Medications   Medication Sig Dispense Refill     fish oil-omega-3 fatty acids 1000 MG capsule Take 2 g by mouth daily (Patient not taking: Reported on 11/10/2022)       multivitamin w/minerals (THERA-VIT-M) tablet Take 1 tablet by mouth daily (Patient not taking: Reported on 11/10/2022)       Allergies   Allergen Reactions     Ampicillin       Social History     Tobacco Use     Smoking status: Never     Smokeless tobacco: Never   Substance Use Topics     Alcohol use: Yes     Comment: infrequent glass of wine or beer       History  "  Drug Use Unknown         Objective     BP (!) 146/80 (BP Location: Right arm, Patient Position: Chair, Cuff Size: Adult Regular)   Pulse 105   Temp 97.5  F (36.4  C) (Oral)   Resp 15   Ht 1.61 m (5' 3.39\")   Wt 57.6 kg (127 lb)   LMP 08/08/2004   SpO2 99%   BMI 22.22 kg/m      Physical Exam    GENERAL APPEARANCE: healthy, alert and no distress     EYES: EOMI, PERRL     HENT: ear canals and TM's normal and nose and mouth without ulcers or lesions     NECK: no adenopathy, no asymmetry, masses, or scars and thyroid normal to palpation     RESP: lungs clear to auscultation - no rales, rhonchi or wheezes     CV: regular rates and rhythm, normal S1 S2, no S3 or S4 and no murmur, click or rub     ABDOMEN:  soft, nontender, no HSM or masses and bowel sounds normal     MS: extremities normal- no gross deformities noted, no evidence of inflammation in joints, FROM in all extremities.     SKIN: no suspicious lesions or rashes     NEURO: Normal strength and tone, sensory exam grossly normal, mentation intact and speech normal     PSYCH: mentation appears normal. and affect normal/bright     LYMPHATICS: No cervical adenopathy    No results for input(s): HGB, PLT, INR, NA, POTASSIUM, CR, A1C in the last 31021 hours.     Diagnostics:  No labs were ordered during this visit.   No EKG required for low risk surgery (cataract, skin procedure, breast biopsy, etc).    Revised Cardiac Risk Index (RCRI):  The patient has the following serious cardiovascular risks for perioperative complications:   - No serious cardiac risks = 0 points     RCRI Interpretation: 0 points: Class I (very low risk - 0.4% complication rate)    Signed Electronically by: Venecia Torres PA-C  Copy of this evaluation report is provided to requesting physician.      "

## 2022-11-10 NOTE — PATIENT INSTRUCTIONS
-Do not take aspirin starting 7 days prior to your surgery unless specifically told otherwise.    -Do not take NSAIDs (ex: ibuprofen/Advil, naproxen/Aleve) starting 7 days prior to your surgery.    -Do not take omega 3 fatty acid supplements (like fish oil) and any oral vitamin E supplement starting 7 days prior to your surgery.      -OK to use acetaminophen (Tylenol) for pain control until your surgery.    If you have obstructive sleep apnea and use CPAP, bring your machine with you.    The surgery team or pre-operative nurse will give you detailed information about restrictions on eating and drinking before surgery and if you need to complete a special bathing procedure prior to surgery.

## 2022-11-14 PROCEDURE — 88305 TISSUE EXAM BY PATHOLOGIST: CPT | Mod: TC,ORL | Performed by: COLON & RECTAL SURGERY

## 2022-11-14 PROCEDURE — 88305 TISSUE EXAM BY PATHOLOGIST: CPT | Mod: 26 | Performed by: PATHOLOGY

## 2022-11-14 PROCEDURE — 88342 IMHCHEM/IMCYTCHM 1ST ANTB: CPT | Mod: 26 | Performed by: PATHOLOGY

## 2022-11-15 ENCOUNTER — LAB REQUISITION (OUTPATIENT)
Dept: LAB | Facility: CLINIC | Age: 67
End: 2022-11-15
Payer: COMMERCIAL

## 2022-11-17 LAB
PATH REPORT.COMMENTS IMP SPEC: NORMAL
PATH REPORT.FINAL DX SPEC: NORMAL
PATH REPORT.GROSS SPEC: NORMAL
PATH REPORT.MICROSCOPIC SPEC OTHER STN: NORMAL
PATH REPORT.MICROSCOPIC SPEC OTHER STN: NORMAL
PATH REPORT.RELEVANT HX SPEC: NORMAL
PHOTO IMAGE: NORMAL

## 2023-02-01 ENCOUNTER — MYC MEDICAL ADVICE (OUTPATIENT)
Dept: FAMILY MEDICINE | Facility: CLINIC | Age: 68
End: 2023-02-01
Payer: COMMERCIAL

## 2023-02-02 ENCOUNTER — VIRTUAL VISIT (OUTPATIENT)
Dept: FAMILY MEDICINE | Facility: CLINIC | Age: 68
End: 2023-02-02
Payer: COMMERCIAL

## 2023-02-02 DIAGNOSIS — R21 RASH AND NONSPECIFIC SKIN ERUPTION: Primary | ICD-10-CM

## 2023-02-02 DIAGNOSIS — U07.1 CLINICAL DIAGNOSIS OF COVID-19: ICD-10-CM

## 2023-02-02 PROCEDURE — 99213 OFFICE O/P EST LOW 20 MIN: CPT | Mod: CS | Performed by: FAMILY MEDICINE

## 2023-02-02 RX ORDER — TRIAMCINOLONE ACETONIDE 1 MG/ML
LOTION TOPICAL 2 TIMES DAILY
Qty: 60 ML | Refills: 1 | Status: SHIPPED | OUTPATIENT
Start: 2023-02-02 | End: 2023-06-28

## 2023-02-02 NOTE — LETTER
February 2, 2023      Cinthya Higgins  [address]  [facility] [address]        To Whom It May Concern:    Cinthya Higgins was seen in our clinic. She may return to work with the following: no restrictions on or about 2/6/23. Please excuse her from work from 1/30/23 to 2/3/23.      Sincerely,        Basil Hairston MD, MD

## 2023-02-02 NOTE — TELEPHONE ENCOUNTER
Writer responded via [employer].    MELI NewellN, RN-BC  [facility]

## 2023-02-02 NOTE — PROGRESS NOTES
Maria Dolores is a 67 year old who is being evaluated via a billable video visit.      How would you like to obtain your AVS? [employer]  If the video visit is dropped, the invitation should be resent by: Text to cell phone: [phone]  Will anyone else be joining your video visit? No        Assessment & Plan     Rash and nonspecific skin eruption  She has had a similar symptoms when she was ill in the past.  Does not have a typical eczematous symptoms.  Reasonable to do topical steroid but if her symptoms are persistent I recommended her  to take images and uploaded to [employer].  As a last resort I would recommend oral steroids. she understands.  - triamcinolone (KENALOG) 0.1 % external lotion; Apply topically 2 times daily    Clinical diagnosis of COVID-19  On 5th days of symptoms. Does not think she needs antiviral. Doing really well. Work note tiven and what symptoms to watch for discussed. Home care.                    No follow-ups on file.    Basil Hairston MD, MD  [facility]   Maria Dolores is a 67 year old, presenting for the following health issues:  No chief complaint on file.      History of Present Illness       Reason for visit:  COVID  Symptom onset:  3-7 days ago  Symptoms include:  Cough, congestion, fever, rash  Symptom intensity:  Moderate  Symptom progression:  Improving  Had these symptoms before:  Yes  Has tried/received treatment for these symptoms:  No  What makes it worse:  Lack of sleep  What makes it better:  Ice on rashShe consumes 0 sweetened beverage(s) daily.She exercises with enough effort to increase her heart rate 10 to 19 minutes per day.  She exercises with enough effort to increase her heart rate 6 days per week.       covid - much better than before.   Symptoms started on Sunday.    positive on Saturday. She was positive on Monday.     Not coughing as much, not is not as runny  Main issue - rash but better today.   Body may be stressed out  from covid. Before much covid testing - cough, fever and rash all over body - was told could be eczema. It was before vaccination for covid.   When got J&J vaccine - similar rash. Now again.   History of eczema when stressed. This is more intense. It is itchy but getting better. All over back, torso, arm folds and behind knee, upper thigh, lower back   Benadryl - did not help at all.   Work -  Used to work for [employer]. 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     Review of Systems         Objective           Vitals:  No vitals were obtained today due to virtual visit.    Physical Exam   GENERAL: Healthy, alert and no distress  EYES: Eyes grossly normal to inspection.  No discharge or erythema, or obvious scleral/conjunctival abnormalities.  RESP: No audible wheeze, cough, or visible cyanosis.  No visible retractions or increased work of breathing.    SKIN: hard to tell due to video visit but on lower back few erythematous diffuse rash present.   NEURO: Cranial nerves grossly intact.  Mentation and speech appropriate for age.  PSYCH: Mentation appears normal, affect normal/bright, judgement and insight intact, normal speech and appearance well-groomed.                 Video-Visit Details    Type of service:  Video Visit   Video Start Time: 1:38 PM  Video End Time:1:55 PM    Originating Location (pt. Location): Home    Distant Location (provider location):  Off-site  Platform used for Video Visit: Kg

## 2023-02-02 NOTE — TELEPHONE ENCOUNTER
Writer responded via [employer].    MELI NewellN, RN-BC  [facility]

## 2023-05-23 ENCOUNTER — PATIENT OUTREACH (OUTPATIENT)
Dept: CARE COORDINATION | Facility: CLINIC | Age: 68
End: 2023-05-23
Payer: COMMERCIAL

## 2023-06-05 ENCOUNTER — ANCILLARY PROCEDURE (OUTPATIENT)
Dept: MAMMOGRAPHY | Facility: CLINIC | Age: 68
End: 2023-06-05
Attending: PHYSICIAN ASSISTANT
Payer: COMMERCIAL

## 2023-06-05 DIAGNOSIS — Z12.31 VISIT FOR SCREENING MAMMOGRAM: ICD-10-CM

## 2023-06-05 PROCEDURE — 77067 SCR MAMMO BI INCL CAD: CPT

## 2023-06-05 PROCEDURE — 77067 SCR MAMMO BI INCL CAD: CPT | Mod: 26

## 2023-06-22 ASSESSMENT — ENCOUNTER SYMPTOMS
CHILLS: 0
DYSURIA: 0
DIZZINESS: 0
ABDOMINAL PAIN: 0
ARTHRALGIAS: 0
HEARTBURN: 0
SORE THROAT: 0
PARESTHESIAS: 0
DIARRHEA: 0
SHORTNESS OF BREATH: 0
FEVER: 0
WEAKNESS: 0
PALPITATIONS: 0
NAUSEA: 0
COUGH: 0
JOINT SWELLING: 0
CONSTIPATION: 0
HEMATOCHEZIA: 0
MYALGIAS: 0
BREAST MASS: 0
NERVOUS/ANXIOUS: 0
HEMATURIA: 0
HEADACHES: 0
FREQUENCY: 0
EYE PAIN: 0

## 2023-06-22 ASSESSMENT — ACTIVITIES OF DAILY LIVING (ADL): CURRENT_FUNCTION: NO ASSISTANCE NEEDED

## 2023-06-28 ENCOUNTER — OFFICE VISIT (OUTPATIENT)
Dept: FAMILY MEDICINE | Facility: CLINIC | Age: 68
End: 2023-06-28
Payer: COMMERCIAL

## 2023-06-28 VITALS
OXYGEN SATURATION: 99 % | RESPIRATION RATE: 16 BRPM | WEIGHT: 126 LBS | BODY MASS INDEX: 21.51 KG/M2 | DIASTOLIC BLOOD PRESSURE: 86 MMHG | TEMPERATURE: 97.2 F | SYSTOLIC BLOOD PRESSURE: 128 MMHG | HEART RATE: 102 BPM | HEIGHT: 64 IN

## 2023-06-28 DIAGNOSIS — Z00.00 ENCOUNTER FOR MEDICARE ANNUAL WELLNESS EXAM: Primary | ICD-10-CM

## 2023-06-28 PROBLEM — M81.0 AGE-RELATED OSTEOPOROSIS WITHOUT CURRENT PATHOLOGICAL FRACTURE: Status: ACTIVE | Noted: 2023-06-28

## 2023-06-28 PROCEDURE — G0439 PPPS, SUBSEQ VISIT: HCPCS | Performed by: PHYSICIAN ASSISTANT

## 2023-06-28 RX ORDER — CHLORAL HYDRATE 500 MG
CAPSULE ORAL
COMMUNITY

## 2023-06-28 RX ORDER — MULTIPLE VITAMINS W/ MINERALS TAB 9MG-400MCG
TAB ORAL
COMMUNITY

## 2023-06-28 ASSESSMENT — ENCOUNTER SYMPTOMS
NAUSEA: 0
HEADACHES: 0
PALPITATIONS: 0
SHORTNESS OF BREATH: 0
MYALGIAS: 0
PARESTHESIAS: 0
NERVOUS/ANXIOUS: 0
ABDOMINAL PAIN: 0
EYE PAIN: 0
ARTHRALGIAS: 0
HEARTBURN: 0
COUGH: 0
FREQUENCY: 0
CONSTIPATION: 0
FEVER: 0
DIZZINESS: 0
WEAKNESS: 0
HEMATOCHEZIA: 0
DYSURIA: 0
DIARRHEA: 0
JOINT SWELLING: 0
SORE THROAT: 0
HEMATURIA: 0
BREAST MASS: 0
CHILLS: 0

## 2023-06-28 ASSESSMENT — PAIN SCALES - GENERAL: PAINLEVEL: NO PAIN (0)

## 2023-06-28 ASSESSMENT — ACTIVITIES OF DAILY LIVING (ADL): CURRENT_FUNCTION: NO ASSISTANCE NEEDED

## 2023-06-28 NOTE — PROGRESS NOTES
"SUBJECTIVE:   Maria Dolores is a 67 year old who presents for Preventive Visit.      6/28/2023     3:07 PM   Additional Questions   Roomed by nancy chaudhry   Accompanied by none         6/28/2023     3:07 PM   Patient Reported Additional Medications   Patient reports taking the following new medications none     Are you in the first 12 months of your Medicare coverage?  No    Healthy Habits:    In general, how would you rate your overall health?  Good    Frequency of exercise:  6-7 days/week    Duration of exercise:  15-30 minutes    Do you usually eat at least 4 servings of fruit and vegetables a day, include whole grains    & fiber and avoid regularly eating high fat or \"junk\" foods?  Yes    Taking medications regularly:  Yes    Medication side effects:  None    Ability to successfully perform activities of daily living:  No assistance needed    Home Safety:  No safety concerns identified    Hearing Impairment:  Difficulty understanding soft or whispered speech    In the past 6 months, have you been bothered by leaking of urine?  No    In general, how would you rate your overall mental or emotional health?  Good      PHQ-2 Total Score:    Additional concerns today:  No    Have you ever done Advance Care Planning? (For example, a Health Directive, POLST, or a discussion with a medical provider or your loved ones about your wishes): Yes, patient states has an Advance Care Planning document and will bring a copy to the clinic.     Fall risk  Fallen 2 or more times in the past year?: No  Any fall with injury in the past year?: No    Cognitive Screening   1) Repeat 3 items (Leader, Season, Table)      2) Clock draw:   NORMAL  3) 3 item recall: Recalls 3 objects  Results: 3 items recalled: COGNITIVE IMPAIRMENT LESS LIKELY    Mini-CogTM Copyright KEIRA Crenshaw. Licensed by the author for use in [facility]; reprinted with permission (eduar@.[facility]). All rights reserved.      Do you have sleep apnea, excessive snoring or daytime " drowsiness?: yes    Reviewed and updated as needed this visit by clinical staff   Tobacco  Allergies  Meds              Reviewed and updated as needed this visit by Provider                 Social History     Tobacco Use     Smoking status: Never     Passive exposure: Never     Smokeless tobacco: Never   Substance Use Topics     Alcohol use: Yes     Comment: infrequent glass of wine or beer             6/22/2023     9:44 PM   Alcohol Use   Prescreen: >3 drinks/day or >7 drinks/week? No     Do you have a current opioid prescription? No  Do you use any other controlled substances or medications that are not prescribed by a provider? None    Current providers sharing in care for this patient include:   Patient Care Team:  Venecia Torres PA-C as PCP - General (Physician Assistant - Medical)  Venecia Torres PA-C as Assigned PCP    The following health maintenance items are reviewed in Epic and correct as of today:  Health Maintenance   Topic Date Due     ZOSTER IMMUNIZATION (1 of 2) Never done     COVID-19 Vaccine (5 - Additional dose for Yun series) 02/26/2023     ANNUAL REVIEW OF HM ORDERS  06/22/2023     MEDICARE ANNUAL WELLNESS VISIT  06/22/2023     FALL RISK ASSESSMENT  06/28/2024     MAMMO SCREENING  06/05/2025     LIPID  06/22/2027     ADVANCE CARE PLANNING  06/22/2027     COLORECTAL CANCER SCREENING  09/09/2029     DTAP/TDAP/TD IMMUNIZATION (5 - Td or Tdap) 05/05/2031     DEXA  04/21/2032     HEPATITIS C SCREENING  Completed     PHQ-2 (once per calendar year)  Completed     INFLUENZA VACCINE  Completed     Pneumococcal Vaccine: 65+ Years  Completed     IPV IMMUNIZATION  Aged Out     MENINGITIS IMMUNIZATION  Aged Out     PAP  Discontinued     FHS-7:       5/31/2022     3:11 PM 6/20/2022     8:23 PM 11/3/2022     2:07 PM 6/5/2023     2:33 PM 6/22/2023     9:46 PM   Breast CA Risk Assessment (FHS-7)   Did any of your first-degree relatives have breast or ovarian cancer? No No No No No   Did any of your  "relatives have bilateral breast cancer? No No No No No   Did any man in your family have breast cancer? No No No No No   Did any woman in your family have breast and ovarian cancer? No No No No No   Did any woman in your family have breast cancer before age 50 y? No No No No No   Do you have 2 or more relatives with breast and/or ovarian cancer? No No No No No   Do you have 2 or more relatives with breast and/or bowel cancer? No No No No No     Mammogram Screening: Recommended mammography every 1-2 years with patient discussion and risk factor consideration  Pertinent mammograms are reviewed under the imaging tab.    Review of Systems   Constitutional: Negative for chills and fever.   HENT: Negative for congestion, ear pain, hearing loss and sore throat.    Eyes: Negative for pain and visual disturbance.   Respiratory: Negative for cough and shortness of breath.    Cardiovascular: Negative for chest pain, palpitations and peripheral edema.   Gastrointestinal: Negative for abdominal pain, constipation, diarrhea, heartburn, hematochezia and nausea.   Breasts:  Negative for tenderness, breast mass and discharge.   Genitourinary: Negative for dysuria, frequency, genital sores, hematuria, pelvic pain, urgency, vaginal bleeding and vaginal discharge.   Musculoskeletal: Negative for arthralgias, joint swelling and myalgias.   Skin: Negative for rash.   Neurological: Negative for dizziness, weakness, headaches and paresthesias.   Psychiatric/Behavioral: Negative for mood changes. The patient is not nervous/anxious.        OBJECTIVE:   /86 (BP Location: Right arm, Patient Position: Chair, Cuff Size: Adult Regular)   Pulse 102   Temp 97.2  F (36.2  C) (Temporal)   Resp 16   Ht 1.613 m (5' 3.5\")   Wt 57.2 kg (126 lb)   LMP 08/08/2004   SpO2 99%   BMI 21.97 kg/m   Estimated body mass index is 21.97 kg/m  as calculated from the following:    Height as of this encounter: 1.613 m (5' 3.5\").    Weight as of this " encounter: 57.2 kg (126 lb).  Physical Exam  GENERAL: healthy, alert and no distress  EYES: Eyes grossly normal to inspection, PERRL and conjunctivae and sclerae normal  HENT: ear canals and TM's normal, nose and mouth without ulcers or lesions  NECK: no adenopathy, no asymmetry, masses, or scars and thyroid normal to palpation  RESP: lungs clear to auscultation - no rales, rhonchi or wheezes  CV: regular rate and rhythm, normal S1 S2, no S3 or S4, no murmur, click or rub, no peripheral edema and peripheral pulses strong  ABDOMEN: soft, nontender, no hepatosplenomegaly, no masses and bowel sounds normal  MS: no gross musculoskeletal defects noted, no edema  SKIN: no suspicious lesions or rashes  NEURO: Normal strength and tone, mentation intact and speech normal  PSYCH: mentation appears normal, affect normal/bright      ASSESSMENT / PLAN:   Cinthya was seen today for physical.    Diagnoses and all orders for this visit:    Encounter for Medicare annual wellness exam      COUNSELING:  Reviewed preventive health counseling, as reflected in patient instructions        She reports that she has never smoked. She has never been exposed to tobacco smoke. She has never used smokeless tobacco.      Appropriate preventive services were discussed with this patient, including applicable screening as appropriate for cardiovascular disease, diabetes, osteopenia/osteoporosis, and glaucoma.  As appropriate for age/gender, discussed screening for colorectal cancer, prostate cancer, breast cancer, and cervical cancer. Checklist reviewing preventive services available has been given to the patient.    Reviewed patients plan of care and provided an AVS. The Basic Care Plan (routine screening as documented in Health Maintenance) for Cinthya meets the Care Plan requirement. This Care Plan has been established and reviewed with the Patient.          Venecia Torres PA-C  [facility]  Risks:    I have reviewed Opioid Use Disorder and Substance Use Disorder risk factors and made any needed referrals.

## 2023-06-28 NOTE — PATIENT INSTRUCTIONS
Patient Education   Personalized Prevention Plan  You are due for the preventive services outlined below.  Your care team is available to assist you in scheduling these services.  If you have already completed any of these items, please share that information with your care team to update in your medical record.  Health Maintenance Due   Topic Date Due    Zoster (Shingles) Vaccine (1 of 2) Never done    COVID-19 Vaccine (5 - Additional dose for Yun series) 02/26/2023    ANNUAL REVIEW OF HM ORDERS  06/22/2023    Annual Wellness Visit  06/22/2023       Signs of Hearing Loss  Hearing loss is a problem shared by many people. In fact, it's one of the most common health problems, particularly as people age. Most people aged 65 and older have some hearing loss. By age 80, almost everyone does. Hearing loss often occurs slowly over the years. So, you may not realize your hearing has gotten worse.   When sudden hearing loss occurs, it's important to contact your healthcare provider right away. Your provider will do a medical exam and a hearing exam as soon as possible. This is to help find the cause and type of your sudden hearing loss. Based on your diagnosis, your healthcare provider will discuss possible treatments.      Hearing much better with one ear can be a sign of hearing loss.     Have your hearing checked  Call your healthcare provider if you:   Have to strain to hear normal conversation  Have to watch other people s faces very carefully to follow what they re saying  Need to ask people to repeat what they ve said  Often misunderstand what people are saying  Turn the volume of the television or radio up so high that others complain  Feel that people are mumbling when they re talking to you  Find that the effort to hear leaves you feeling tired and irritated  Notice, when using the phone, that you hear better with one ear than the other  Sang last reviewed this educational content on 6/1/2022 2000-2022  [employer]. All rights reserved. This information is not intended as a substitute for professional medical care. Always follow your healthcare professional's instructions.

## 2023-11-29 ENCOUNTER — OFFICE VISIT (OUTPATIENT)
Dept: FAMILY MEDICINE | Facility: CLINIC | Age: 68
End: 2023-11-29
Payer: COMMERCIAL

## 2023-11-29 VITALS
TEMPERATURE: 98.5 F | BODY MASS INDEX: 22.06 KG/M2 | HEIGHT: 63 IN | DIASTOLIC BLOOD PRESSURE: 80 MMHG | RESPIRATION RATE: 16 BRPM | HEART RATE: 104 BPM | SYSTOLIC BLOOD PRESSURE: 127 MMHG | OXYGEN SATURATION: 100 % | WEIGHT: 124.5 LBS

## 2023-11-29 DIAGNOSIS — R06.83 SNORING: Primary | ICD-10-CM

## 2023-11-29 DIAGNOSIS — Z01.818 PRE-OP EXAM: ICD-10-CM

## 2023-11-29 DIAGNOSIS — R06.81 APNEA: ICD-10-CM

## 2023-11-29 DIAGNOSIS — H25.9 AGE-RELATED CATARACT OF BOTH EYES, UNSPECIFIED AGE-RELATED CATARACT TYPE: ICD-10-CM

## 2023-11-29 PROCEDURE — 99214 OFFICE O/P EST MOD 30 MIN: CPT | Performed by: NURSE PRACTITIONER

## 2023-11-29 RX ORDER — PREDNISOLONE ACETATE 10 MG/ML
SUSPENSION/ DROPS OPHTHALMIC
COMMUNITY
Start: 2023-11-20 | End: 2024-03-06

## 2023-11-29 RX ORDER — RESPIRATORY SYNCYTIAL VIRUS VACCINE 120MCG/0.5
0.5 KIT INTRAMUSCULAR ONCE
Qty: 1 EACH | Refills: 0 | Status: CANCELLED | OUTPATIENT
Start: 2023-11-29 | End: 2023-11-29

## 2023-11-29 RX ORDER — A/SINGAPORE/GP1908/2015 IVR-180 (AN A/MICHIGAN/45/2015 (H1N1)PDM09-LIKE VIRUS, A/HONG KONG/4801/2014, NYMC X-263B (H3N2) (AN A/HONG KONG/4801/2014-LIKE VIRUS), AND B/BRISBANE/60/2008, WILD TYPE (A B/BRISBANE/60/2008-LIKE VIRUS) 15; 15; 15 UG/.5ML; UG/.5ML; UG/.5ML
INJECTION, SUSPENSION INTRAMUSCULAR
COMMUNITY
Start: 2023-09-26 | End: 2024-07-02

## 2023-11-29 RX ORDER — COVID-19 VACCINE, MRNA 50 UG/.5ML
INJECTION, SUSPENSION INTRAMUSCULAR
COMMUNITY
Start: 2023-09-26 | End: 2024-07-02

## 2023-11-29 ASSESSMENT — PAIN SCALES - GENERAL: PAINLEVEL: NO PAIN (0)

## 2023-11-29 NOTE — PROGRESS NOTES
[address]  [address]  [place] [address]  Phone: [phone]  Fax: [phone]  Primary Provider: Venecia Torres  Pre-op Performing Provider: RAYA BATES      PREOPERATIVE EVALUATION:  Today's date: 11/29/2023    Pat is a 68 year old, presenting for the following:  Pre-Op Exam        11/29/2023     4:05 PM   Additional Questions   Roomed by Michelle TINOCO   Accompanied by self       Surgical Information:  Surgery/Procedure:   PHACO iol- both Eye  Surgery Location: [facility] [address] [facility], [address]  Surgeon: Dr.Ann Franklin   Surgery Date: 12/04/2023  Time of Surgery: TBD  Where patient plans to recover: At home with family  Fax number for surgical facility: [phone]    Assessment & Plan     The proposed surgical procedure is considered LOW risk.    Pre-op exam  preoperative examination wnl; no concerning health issues identified prior to surgery; reviewed cardiac risk, chronic health history, medications and discussed presurgical medication management. Patient deemed medically stable for planned surgery.  Plan: will check the following:      Age-related cataract of both eyes, unspecified age-related cataract type   Scheduled procedure on 12/4 and 12/14     Snoring  Apnea  - Adult Sleep Eval & Management  Referral                  - No identified additional risk factors other than previously addressed    Antiplatelet or Anticoagulation Medication Instructions:   - Patient is on no antiplatelet or anticoagulation medications.    Additional Medication Instructions:  Patient is on no additional chronic medications  Hold all herbal supplements starting starting today until after second eye surgery on 12/14    RECOMMENDATION:  APPROVAL GIVEN to proceed with proposed procedure, without further diagnostic evaluation.      Subjective       HPI related to upcoming procedure:   68 year old  year old female with PMH   Patient Active Problem List    Diagnosis Code    CONTACT W OR EXPOSURE to asbestos Z20.89    Cervical cancer screening Z12.4    Nontoxic multinodular goiter E04.2    Mucous polyp of cervix N84.1    Disorder of bone and cartilage M89.9, M94.9    Allergic rhinitis J30.9    Age-related osteoporosis without current pathological fracture M81.0     in clinic for preoperative examination for upcoming procedure cataract removal.     Review of all chronic health conditions, medications, smoking history and COVID status completed to ensure patient medically stable for surgery.           11/26/2023     5:26 PM   Preop Questions   1. Have you ever had a heart attack or stroke? No   2. Have you ever had surgery on your heart or blood vessels, such as a stent placement, a coronary artery bypass, or surgery on an artery in your head, neck, heart, or legs? No   3. Do you have chest pain with activity? No   4. Do you have a history of  heart failure? No   5. Do you currently have a cold, bronchitis or symptoms of other infection? No   6. Do you have a cough, shortness of breath, or wheezing? No   7. Do you or anyone in your family have previous history of blood clots? No   8. Do you or does anyone in your family have a serious bleeding problem such as prolonged bleeding following surgeries or cuts? No   9. Have you ever had problems with anemia or been told to take iron pills? No   10. Have you had any abnormal blood loss such as black, tarry or bloody stools, or abnormal vaginal bleeding? No   11. Have you ever had a blood transfusion? No   12. Are you willing to have a blood transfusion if it is medically needed before, during, or after your surgery? Yes   13. Have you or any of your relatives ever had problems with anesthesia? No   14. Do you have sleep apnea, excessive snoring or daytime drowsiness? YES -    14a. Do you have a CPAP machine? No   15. Do you have any artifical heart valves or other implanted medical devices like a pacemaker, defibrillator, or  continuous glucose monitor? No   16. Do you have artificial joints? No   17. Are you allergic to latex? No       Health Care Directive:  Patient does not have a Health Care Directive or Living Will: Discussed advance care planning with patient; information given to patient to review.    Preoperative Review of :   reviewed - no record of controlled substances prescribed.      Status of Chronic Conditions:  See problem list for active medical problems.  Problems all longstanding and stable, except as noted/documented.  See ROS for pertinent symptoms related to these conditions.    SLEEP PROBLEM - Patient has a longstanding history of snoring.. Patient has tried OTC medications with limited success.     Review of Systems  CONSTITUTIONAL: NEGATIVE for fever, chills, change in weight  INTEGUMENTARY/SKIN: NEGATIVE for worrisome rashes, moles or lesions  EYES: NEGATIVE for vision changes or irritation  ENT/MOUTH: NEGATIVE for ear, mouth and throat problems  RESP: NEGATIVE for significant cough or SOB  CV: NEGATIVE for chest pain, palpitations or peripheral edema  GI: NEGATIVE for nausea, abdominal pain, heartburn, or change in bowel habits  : NEGATIVE for frequency, dysuria, or hematuria  MUSCULOSKELETAL: NEGATIVE for significant arthralgias or myalgia  NEURO: NEGATIVE for weakness, dizziness or paresthesias  ENDOCRINE: NEGATIVE for temperature intolerance, skin/hair changes  HEME: NEGATIVE for bleeding problems  PSYCHIATRIC: NEGATIVE for changes in mood or affect    Patient Active Problem List    Diagnosis Date Noted    Age-related osteoporosis without current pathological fracture 06/28/2023     Priority: Medium    Nontoxic multinodular goiter 11/10/2022     Priority: Medium     Formatting of this note might be different from the original.  Created by Conversion      Mucous polyp of cervix 11/10/2022     Priority: Medium     Formatting of this note might be different from the original.  Created by Conversion       Disorder of bone and cartilage 11/10/2022     Priority: Medium     Formatting of this note might be different from the original.  Created by Conversion    Replacement Utility updated for latest IMO load      Allergic rhinitis 11/10/2022     Priority: Medium     Formatting of this note might be different from the original.  Created by Conversion  [facility] Annotation: Nov 8 2010  9:49PM - Julisa Boone: mild seasonal sx    Replacement Utility updated for latest IMO load      Cervical cancer screening 05/13/2021     Priority: Medium     No further cervical cancer screening recommended.     Pt age 65  Pt needs 3 consecutive negative pap tests or 2 consecutive negative cotests within 10 years with the last one being in the last 5 years, before pap screening can be discontinued.    No history of DILLON 2 or greater found in epic.   Pap history below.    04/7/17 NIL Pap, Neg HR HPV  05/6/21 NIL Pap, Neg HR HPV        CONTACT W OR EXPOSURE to asbestos 08/16/2004     Priority: Medium      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, positive for HPV 16     Past Surgical History:   Procedure Laterality Date    BREAST CYST ASPIRATION Left     COLONOSCOPY  2019    EYE SURGERY  1997    plugged tear duct    HC REMOVAL OF OVARIAN CYST(S)      Description: Ovarian Cystectomy;  Proc Date: 01/01/1980;    ZZC NONSPECIFIC PROCEDURE      Vag del x 2    ZZC NONSPECIFIC PROCEDURE      Ovarian cyst removed-benign    ZZC NONSPECIFIC PROCEDURE      Eye surgery    ZZC NONSPECIFIC PROCEDURE      Needle bx-(L) breast    ZZC NONSPECIFIC PROCEDURE      Endometrial bx     Current Outpatient Medications   Medication Sig Dispense Refill    fish oil-omega-3 fatty acids 1000 MG capsule       multivitamin w/minerals (MULTI-VITAMIN) tablet       sodium chloride (OCEAN) 0.65 % nasal spray Spray 1 spray into both nostrils daily as needed         Allergies   Allergen Reactions    Ampicillin         Social History     Tobacco  "Use    Smoking status: Never     Passive exposure: Never    Smokeless tobacco: Never   Substance Use Topics    Alcohol use: Yes     Comment: infrequent glass of wine or beer     Family History   Problem Relation Age of Onset    Hypertension Mother     Dementia Mother     Osteoporosis Mother     Arthritis Father     Cerebrovascular Disease Father         at age 85    Colon Cancer Father     Prostate Cancer Father         at age 85    No Known Problems Brother     No Known Problems Brother     Neurologic Disorder Maternal Grandmother         parkinson's disease    Cancer Maternal Grandfather         luekemia    Alzheimer Disease Paternal Grandmother     Lung Cancer Paternal Grandfather         smoker    Myocardial Infarction Paternal Grandfather         in his 50's    Breast Cancer No family hx of      History   Drug Use Unknown         Objective     /80 (BP Location: Right arm, Patient Position: Sitting, Cuff Size: Adult Regular)   Pulse 104   Temp 98.5  F (36.9  C) (Temporal)   Resp 16   Ht 1.61 m (5' 3.4\")   Wt 56.5 kg (124 lb 8 oz)   LMP 08/08/2004   SpO2 100%   BMI 21.78 kg/m      Physical Exam    GENERAL APPEARANCE: healthy, alert and no distress     EYES: EOMI, PERRL     HENT: ear canals and TM's normal and nose and mouth without ulcers or lesions     NECK: no adenopathy, no asymmetry, masses, or scars and thyroid normal to palpation     RESP: lungs clear to auscultation - no rales, rhonchi or wheezes     CV: regular rates and rhythm, normal S1 S2, no S3 or S4 and no murmur, click or rub     ABDOMEN:  soft, nontender, no HSM or masses and bowel sounds normal     MS: extremities normal- no gross deformities noted, no evidence of inflammation in joints, FROM in all extremities.     SKIN: no suspicious lesions or rashes     NEURO: Normal strength and tone, sensory exam grossly normal, mentation intact and speech normal     PSYCH: mentation appears normal. and affect normal/bright     LYMPHATICS: No " "cervical adenopathy    No results for input(s): \"HGB\", \"PLT\", \"INR\", \"NA\", \"POTASSIUM\", \"CR\", \"A1C\" in the last 22449 hours.     Diagnostics:  No labs were ordered during this visit.   No EKG required for low risk surgery (cataract, skin procedure, breast biopsy, etc).  No EKG required, no history of coronary heart disease, significant arrhythmia, peripheral arterial disease or other structural heart disease.    Revised Cardiac Risk Index (RCRI):  The patient has the following serious cardiovascular risks for perioperative complications:   - No serious cardiac risks = 0 points     RCRI Interpretation: 0 points: Class I (very low risk - 0.4% complication rate)         Signed Electronically by: CAMILLE Pedersen CNP  Copy of this evaluation report is provided to requesting physician.      "

## 2024-03-03 ASSESSMENT — SLEEP AND FATIGUE QUESTIONNAIRES
HOW LIKELY ARE YOU TO NOD OFF OR FALL ASLEEP WHILE SITTING AND TALKING TO SOMEONE: WOULD NEVER DOZE
HOW LIKELY ARE YOU TO NOD OFF OR FALL ASLEEP WHILE LYING DOWN TO REST IN THE AFTERNOON WHEN CIRCUMSTANCES PERMIT: HIGH CHANCE OF DOZING
HOW LIKELY ARE YOU TO NOD OFF OR FALL ASLEEP WHILE SITTING INACTIVE IN A PUBLIC PLACE: SLIGHT CHANCE OF DOZING
HOW LIKELY ARE YOU TO NOD OFF OR FALL ASLEEP WHILE SITTING QUIETLY AFTER LUNCH WITHOUT ALCOHOL: WOULD NEVER DOZE
HOW LIKELY ARE YOU TO NOD OFF OR FALL ASLEEP WHILE SITTING AND READING: SLIGHT CHANCE OF DOZING
HOW LIKELY ARE YOU TO NOD OFF OR FALL ASLEEP WHILE WATCHING TV: SLIGHT CHANCE OF DOZING
HOW LIKELY ARE YOU TO NOD OFF OR FALL ASLEEP IN A CAR, WHILE STOPPED FOR A FEW MINUTES IN TRAFFIC: WOULD NEVER DOZE
HOW LIKELY ARE YOU TO NOD OFF OR FALL ASLEEP WHEN YOU ARE A PASSENGER IN A CAR FOR AN HOUR WITHOUT A BREAK: SLIGHT CHANCE OF DOZING

## 2024-03-06 ENCOUNTER — OFFICE VISIT (OUTPATIENT)
Dept: SLEEP MEDICINE | Facility: CLINIC | Age: 69
End: 2024-03-06
Attending: NURSE PRACTITIONER
Payer: COMMERCIAL

## 2024-03-06 VITALS
DIASTOLIC BLOOD PRESSURE: 82 MMHG | WEIGHT: 124.9 LBS | RESPIRATION RATE: 18 BRPM | HEART RATE: 99 BPM | HEIGHT: 64 IN | OXYGEN SATURATION: 100 % | BODY MASS INDEX: 21.32 KG/M2 | SYSTOLIC BLOOD PRESSURE: 136 MMHG

## 2024-03-06 DIAGNOSIS — G47.33 OSA (OBSTRUCTIVE SLEEP APNEA): Primary | ICD-10-CM

## 2024-03-06 DIAGNOSIS — R06.81 APNEA: ICD-10-CM

## 2024-03-06 DIAGNOSIS — R06.83 SNORING: ICD-10-CM

## 2024-03-06 PROCEDURE — 99203 OFFICE O/P NEW LOW 30 MIN: CPT | Performed by: INTERNAL MEDICINE
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
## 2024-03-06 NOTE — PROGRESS NOTES
Name: Cinthya Higgins MRN# [id number]   Age: 68 year old YOB: 1955     Date of Consultation: March 6, 2024  Consultation is requested by: CAMILLE Pedersen CNP  [address] [place] [address] Luis Felipe Hinkle  Primary care provider: Venecia Torres       Reason for Sleep Consult:     Cinthya Higgins is sent by Luis Felipe Hinkle for a sleep consultation regarding snoring and possible sleep apnea           Assessment and Plan:     Summary Sleep Diagnoses:  Clinical signs and symptoms of obstructive sleep apnea    Comorbid Diagnoses:  Non- allergic rhinitis    Summary Recommendations(things to be done):  Home sleep study with consideration for mandibular advancement device therapy for mild to moderate sleep apnea    Summary Counseling (items discussed):    We discussed all diagnostic and therapy options for sleep apnea    :              HISTORY PRESENT ILLNESS:     Cinthya Higgins is a 68 year old year old with nightly snoring which is resulting in the need to sleep in a different room from her  currently.  She is  over 50 years of age and postmenopausal with habitual snoring with a moderate risk of obstructive sleep apnea (STOP-BANG 3).  She does not have difficulty initiating or maintaining sleep and does not have complaints of restless leg syndrome.           SLEEP-WAKE SCHEDULE:      Work/School Days: Patient goes to school/work:     Usually gets into bed at  10:15-10:30 with 5 min latency without prolonged mid sleep awakenings  Uses alarm:  6:15 alarm or 7-9 am on non work day  Quiet dark bedroom        SLEEP DISRUPTIONS:    Breathing/Snoring: Frequently with gasping and observed apneas        Movement: no RLS symptoms  Behaviors in Wakefulness/Sleep:  Dream enactment   No  Sleep eating   No  Bruxism yes wears mouthguard   Is there anything else you would like your sleep provider to know:      CAFFEINE AND OTHER SUBSTANCES:    Patient consumes caffeinated beverages  per day:  2 green tea  Patient drinks alcohol near bedtime:  none           SCALES:       EPWORTH SLEEPINESS SCALE         3/3/2024    12:23 PM    [place] Sleepiness Scale ( KATHARINA Vidal  [id number]<br>ESS - [place]/English - Final version - 21 Nov 07 - [facility] [employer].)   Sitting and reading Slight chance of dozing   Watching TV Slight chance of dozing   Sitting, inactive in a public place (e.g. a theatre or a meeting) Slight chance of dozing   As a passenger in a car for an hour without a break Slight chance of dozing   Lying down to rest in the afternoon when circumstances permit High chance of dozing   Sitting and talking to someone Would never doze   Sitting quietly after a lunch without alcohol Would never doze   In a car, while stopped for a few minutes in traffic Would never doze   [place] Score (MC) 7   [place] Score (Sleep) 7         INSOMNIA SEVERITY INDEX (TAVON)          3/3/2024    11:57 AM   Insomnia Severity Index (TAVON)   Difficulty falling asleep 1   Difficulty staying asleep 1   Problems waking up too early 2   How SATISFIED/DISSATISFIED are you with your CURRENT sleep pattern? 2   How NOTICEABLE to others do you think your sleep problem is in terms of impairing the quality of your life? 1   How WORRIED/DISTRESSED are you about your current sleep problem? 1   To what extent do you consider your sleep problem to INTERFERE with your daily functioning (e.g. daytime fatigue, mood, ability to function at work/daily chores, concentration, memory, mood, etc.) CURRENTLY? 1   TAVON Total Score 9       Guidelines for Scoring/Interpretation:  Total score categories:  0-7 = No clinically significant insomnia   8-14 = Subthreshold insomnia   15-21 = Clinical insomnia (moderate severity)  22-28 = Clinical insomnia (severe)  Used via courtesy of www.myhealth.va.gov with permission from Cristobal Zheng PhD., [facility]      STOP BANG         3/6/2024     3:02 PM   STOP BANG Questionnaire (  2008, the American  Society of [employer]est[employer], [employer]   Neck Cir (cm) Clinic: 28 cm   B/P Clinic: 136/82   BMI Clinic: 21.78         Allergies:    Allergies   Allergen Reactions    Ampicillin             Problem List:     Patient Active Problem List   Diagnosis    CONTACT W OR EXPOSURE to asbestos    Cervical cancer screening    Nontoxic multinodular goiter    Mucous polyp of cervix    Disorder of bone and cartilage    Allergic rhinitis    Age-related osteoporosis without current pathological fracture            MEDICATIONS:     Current Outpatient Medications   Medication Sig Dispense Refill    fish oil-omega-3 fatty acids 1000 MG capsule       FLUAD QUADRIVALENT 0.5 ML PRSY injection       multivitamin w/minerals (MULTI-VITAMIN) tablet       sodium chloride (OCEAN) 0.65 % nasal spray Spray 1 spray into both nostrils daily as needed      SPIKEVAX 50 MCG/0.5ML injection       prednisoLONE acetate (PRED FORTE) 1 % ophthalmic suspension          Problem List:  Patient Active Problem List    Diagnosis Date Noted    Age-related osteoporosis without current pathological fracture 06/28/2023     Priority: Medium    Nontoxic multinodular goiter 11/10/2022     Priority: Medium     Formatting of this note might be different from the original.  Created by Conversion      Mucous polyp of cervix 11/10/2022     Priority: Medium     Formatting of this note might be different from the original.  Created by Conversion      Disorder of bone and cartilage 11/10/2022     Priority: Medium     Formatting of this note might be different from the original.  Created by Conversion    Replacement Utility updated for latest IMO load      Allergic rhinitis 11/10/2022     Priority: Medium     Formatting of this note might be different from the original.  Created by Conversion  [facility] Annotation: Nov 8 2010  9:49PM - Julisa Boone: mild seasonal sx    Replacement Utility updated for latest IMO load      Cervical cancer  screening 05/13/2021     Priority: Medium     No further cervical cancer screening recommended.     Pt age 65  Pt needs 3 consecutive negative pap tests or 2 consecutive negative cotests within 10 years with the last one being in the last 5 years, before pap screening can be discontinued.    No history of DILLON 2 or greater found in epic.   Pap history below.    04/7/17 NIL Pap, Neg HR HPV  05/6/21 NIL Pap, Neg HR HPV        CONTACT W OR EXPOSURE to asbestos 08/16/2004     Priority: Medium        Past Medical/Surgical History:  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, positive for HPV 16     Past Surgical History:   Procedure Laterality Date    BREAST CYST ASPIRATION Left     COLONOSCOPY  2019    EYE SURGERY  1997    plugged tear duct    HC REMOVAL OF OVARIAN CYST(S)      Description: Ovarian Cystectomy;  Proc Date: 01/01/1980;    Z NONSPECIFIC PROCEDURE      Vag del x 2    ZZC NONSPECIFIC PROCEDURE      Ovarian cyst removed-benign    ZZC NONSPECIFIC PROCEDURE      Eye surgery    ZZC NONSPECIFIC PROCEDURE      Needle bx-(L) breast    ZZC NONSPECIFIC PROCEDURE      Endometrial bx       Social History:  Social History     Socioeconomic History    Marital status:      Spouse name: David    Number of children: 2    Years of education: Not on file    Highest education level: Not on file   Occupational History    Occupation:      Employer: [employer]   Tobacco Use    Smoking status: Never     Passive exposure: Never    Smokeless tobacco: Never   Vaping Use    Vaping Use: Never used   Substance and Sexual Activity    Alcohol use: Yes     Comment: infrequent glass of wine or beer    Drug use: Never    Sexual activity: Not Currently     Partners: Male     Birth control/protection: Post-menopausal   Other Topics Concern     Service Not Asked    Blood Transfusions No    Caffeine Concern No    Occupational Exposure Not Asked    Hobby Hazards Not Asked    Sleep  Concern No    Stress Concern Yes     Comment: alot of stresses in her life right now    Weight Concern No    Special Diet No    Back Care No    Exercise Yes     Comment: does aerobics    Bike Helmet Not Asked    Seat Belt Yes    Self-Exams Yes    Parent/sibling w/ CABG, MI or angioplasty before 65F 55M? No   Social History Narrative    Not on file     Social Determinants of Health     Financial Resource Strain: Low Risk  (11/26/2023)    Financial Resource Strain     Within the past 12 months, have you or your family members you live with been unable to get utilities (heat, electricity) when it was really needed?: No   Food Insecurity: Low Risk  (11/26/2023)    Food Insecurity     Within the past 12 months, did you worry that your food would run out before you got money to buy more?: No     Within the past 12 months, did the food you bought just not last and you didn t have money to get more?: No   Transportation Needs: Low Risk  (11/26/2023)    Transportation Needs     Within the past 12 months, has lack of transportation kept you from medical appointments, getting your medicines, non-medical meetings or appointments, work, or from getting things that you need?: No   Physical Activity: Not on file   Stress: Not on file   Social Connections: Not on file   Interpersonal Safety: Low Risk  (11/29/2023)    Interpersonal Safety     Do you feel physically and emotionally safe where you currently live?: Yes     Within the past 12 months, have you been hit, slapped, kicked or otherwise physically hurt by someone?: No     Within the past 12 months, have you been humiliated or emotionally abused in other ways by your partner or ex-partner?: No   Housing Stability: Low Risk  (11/26/2023)    Housing Stability     Do you have housing? : Yes     Are you worried about losing your housing?: No       Family History:  Family History   Problem Relation Age of Onset    Hypertension Mother     Dementia Mother     Osteoporosis Mother      "Arthritis Father     Cerebrovascular Disease Father         at age 85    Colon Cancer Father     Prostate Cancer Father         at age 85    No Known Problems Brother     No Known Problems Brother     Neurologic Disorder Maternal Grandmother         parkinson's disease    Cancer Maternal Grandfather         luekemia    Alzheimer Disease Paternal Grandmother     Lung Cancer Paternal Grandfather         smoker    Myocardial Infarction Paternal Grandfather         in his 50's    Breast Cancer No family hx of               Physical Examination:     Vitals: /82   Pulse 99   Resp 18   Ht 1.613 m (5' 3.5\")   Wt 56.7 kg (124 lb 14.4 oz)   LMP 08/08/2004   SpO2 100%   BMI 21.78 kg/m    BMI= Body mass index is 21.78 kg/m .  Mandibular profile normal    GENERAL: alert and no distress  EYES: Eyes grossly normal to inspection.  No discharge or erythema, or obvious scleral/conjunctival abnormalities.  RESP: No audible wheeze, cough, or visible cyanosis.    SKIN: Visible skin clear. No significant rash, abnormal pigmentation or lesions.  NEURO: Cranial nerves grossly intact.  Mentation and speech appropriate for age.  PSYCH: Appropriate affect, tone, and pace of words    Neck Cir (cm): 28 cm             Data: All pertinent previous laboratory data reviewed     Recent Labs   Lab Test 06/22/22  0919   GLC 96           MATHEW PERES MD 3/6/2024     Total time spent reviewing medical records including previous testing and interpretation as well as direct patient contact and documentation on this date: 40 minutes    "

## 2024-03-06 NOTE — PATIENT INSTRUCTIONS
"          MY TREATMENT INFORMATION FOR SLEEP APNEA-  Cinthya Higgins    DOCTOR : MATHEW PERES MD    Am I having a sleep study at a sleep center?  --->Due to normal delays, you will be contacted within 2-4 weeks to schedule    Am I having a home sleep study?  --->Watch the video for the device you are using:    -/drop off device-   https://www.GeneNews.com/watch?v=yGGFBdELGhk        Frequently asked questions:  1. What is Obstructive Sleep Apnea (BERONICA)? BERONICA is the most common type of sleep apnea. Apnea means, \"without breath.\"  Apnea is most often caused by narrowing or collapse of the upper airway as muscles relax during sleep.   Almost everyone has occasional apneas. Most people with sleep apnea have had brief interruptions at night frequently for many years.  The severity of sleep apnea is related to how frequent and severe the events are.   2. What are the consequences of BERONICA? Symptoms include: feeling sleepy during the day, snoring loudly, gasping or stopping of breathing, trouble sleeping, and occasionally morning headaches or heartburn at night.  Sleepiness can be serious and even increase the risk of falling asleep while driving. Other health consequences may include development of high blood pressure and other cardiovascular disease in persons who are susceptible. Untreated BERONICA  can contribute to heart disease, stroke and diabetes.   3. What are the treatment options? In most situations, sleep apnea is a lifelong disease that must be managed with daily therapy. Medications are not effective for sleep apnea and surgery is generally not considered until other therapies have been tried. Your treatment is your choice . Continuous Positive Airway (CPAP) works right away and is the therapy that is effective in nearly everyone. An oral device to hold your jaw forward is usually the next most reliable option. Other options include postioning devices (to keep you off your back), weight loss, and surgery " including a tongue pacing device. There is more detail about some of these options below.  4. Are my sleep studies covered by insurance? Although we will request verification of coverage, we advise you also check in advance of the study to ensure there is coverage.    Important tips for those choosing CPAP and similar devices  For new devices, sign up for device JO to monitor your device for your followup visits  We encourage you to utilize the [employer] oj or website (myAir web (resmed.com) ) to monitor your therapy progress and share the data with your healthcare team when you discuss your sleep apnea.                                                    Know your equipment:  CPAP is continuous positive airway pressure that prevents obstructive sleep apnea by keeping the throat from collapsing while you are sleeping. In most cases, the device is  smart  and can slowly self-adjusts if your throat collapses and keeps a record every day of how well you are treated-this information is available to you and your care team.  BPAP is bilevel positive airway pressure that keeps your throat open and also assists each breath with a pressure boost to maintain adequate breathing.  Special kinds of BPAP are used in patients who have inadequate breathing from lung or heart disease. In most cases, the device is  smart  and can slowly self-adjusts to assist breathing. Like CPAP, the device keeps a record of how well you are treated.  Your mask is your connection to the device. You get to choose what feels most comfortable and the staff will help to make sure if fits. Here: are some examples of the different masks that are available: Magnetic mask aids may assist with use but there are safety issues that should be addressed when considering with magnets* ( see end of discussion).       Key points to remember on your journey with sleep apnea:  Sleep study.  PAP devices often need to be adjusted during a sleep study to show  that they are effective and adjusted right.  Good tips to remember: Try wearing just the mask during a quiet time during the day so your body adapts to wearing it. A humidifier is recommended for comfort in most cases to prevent drying of your nose and throat. Allergy medication from your provider may help you if you are having nasal congestion.  Getting settled-in. It takes more than one night for most of us to get used to wearing a mask. Try wearing just the mask during a quiet time during the day so your body adapts to wearing it. A humidifier is recommended for comfort in most cases. Our team will work with you carefully on the first day and will be in contact within 4 days and again at 2 and 4 weeks for advice and remote device adjustments. Your therapy is evaluated by the device each day.   Use it every night. The more you are able to sleep naturally for 7-8 hours, the more likely you will have good sleep and to prevent health risks or symptoms from sleep apnea. Even if you use it 4 hours it helps. Occasionally all of us are unable to use a medical therapy, in sleep apnea, it is not dangerous to miss one night.   Communicate. Call our skilled team on the number provided on the first day if your visit for problems that make it difficult to wear the device. Over 2 out of 3 patients can learn to wear the device long-term with help from our team. Remember to call our team or your sleep providers if you are unable to wear the device as we may have other solutions for those who cannot adapt to mask CPAP therapy. It is recommended that you sleep your sleep provider within the first 3 months and yearly after that if you are not having problems.   Use it for your health. We encourage use of CPAP masks during daytime quiet periods to allow your face and brain to adapt to the sensation of CPAP so that it will be a more natural sensation to awaken to at night or during naps. This can be very useful during the first few  weeks or months of adapting to CPAP though it does not help medically to wear CPAP during wakefulness and  should not be used as a strategy just to meet guidelines.  Take care of your equipment. Make sure you clean your mask and tubing using directions every day and that your filter and mask are replaced as recommended or if they are not working.     *Masks with magnets:  Updated Contraindications  Masks with magnetic components are contraindicated for use by patients where they, or anyone in close physical contact while using the mask, have the following:   Active medical implants that interact with magnets (i.e., pacemakers, implantable cardioverter defibrillators (ICD), neurostimulators, cerebrospinal fluid (CSF) shunts, insulin/infusion pumps)   Metallic implants/objects containing ferromagnetic material (i.e., aneurysm clips/flow disruption devices, embolic coils, stents, valves, electrodes, implants to restore hearing or balance with implanted magnets, ocular implants, metallic splinters in the eye)  Updated Warning  Keep the mask magnets at a safe distance of at least 6 inches (150 mm) away from implants or medical devices that may be adversely affected by magnetic interference. This warning applies to you or anyone in close physical contact with your mask. The magnets are in the frame and lower headgear clips, with a magnetic field strength of up to 400mT. When worn, they connect to secure the mask but may inadvertently detach while asleep.  Implants/medical devices, including those listed within contraindications, may be adversely affected if they change function under external magnetic fields or contain ferromagnetic materials that attract/repel to magnetic fields (some metallic implants, e.g., contact lenses with metal, dental implants, metallic cranial plates, screws, sunil hole covers, and bone substitute devices). Consult your physician and  of your implant / other medical device for  information on the potential adverse effects of magnetic fields.    BESIDES CPAP, WHAT OTHER THERAPIES ARE THERE?    Positioning Device  Positioning devices are generally used when sleep apnea is mild and only occurs on your back.This example shows a pillow that straps around the waist. It may be appropriate for those whose sleep study shows milder sleep apnea that occurs primarily when lying flat on one's back. Preliminary studies have shown benefit but effectiveness at home may need to be verified by a home sleep test. These devices are generally not covered by medical insurance.  Examples of devices that maintain sleeping on the back to prevent snoring and mild sleep apnea.    Belt type body positioner  http://Pharmacopeia/    Electronic reminder  http://nightshifttherapy.com/            Oral Appliance  What is oral appliance therapy?  An oral appliance device fits on your teeth at night like a retainer used after having braces. The device is made by a specialized dentist and requires several visits over 1-2 months before a manufactured device is made to fit your teeth and is adjusted to prevent your sleep apnea. Once an oral device is working properly, snoring should be improved. A home sleep test may be recommended at that time if to determine whether the sleep apnea is adequately treated.         [facility]  Search engine: https://mms.aadsm.org/members/directory/search_bootstrap.php?org_id=ADSM&   Certified in Dental Sleep Medicine    Bo Bourne  Degree: AMADOR  [address]  [address]  [place] [address]  Professional Phone: [phone]  Website: [email or web address]    Keo Alexis   [facility]   Degree: MD AMADOR   [address] [place] [address]  [address]  [place] [address]  Appointments: [phone]    Eldon Pate  Degree: AMADOR  [facility]  [address]  [address]  [place] [address]  Professional Phone: [phone]: [phone]    [facility]  [address] [place] [address] [address]  [address]  [place] [address]  Appointments: [phone]  Fax: [phone]    Mildred Laure  [facility]  [address]  [place] [address]  Professional Phone: [phone]  Website: [email or web address] [email or web address]      Aly Cobb  Degree: DDS  [address]  [address]  [place] [address]  Professional Phone: [phone]  Fax: [phone]    Van Cabrera  Degree: AMADOR  [facility]  [address]  [address]  [place] [address]  Professional Phone: [phone]  Website: [email or web address]      Janna Loaiza  [facility]-you should verify insurance coverage  [facility] [address]  [address], [place] [address]  Professional Phone: [phone]  Website: [email or web address]      Doris Ponce--DOES NOT ACCEPT INSURANCE  Degree: AMADOR--you should verify insurance coverage  [facility]  [address] [place] [address]  Professional Phone: [phone]     Isi Byrne  Degree: AMADOR, PhD  [facility]  [address] [place] [address]   Appointments: [phone]   Fax: [phone]     Lennox Carrizales  Degree: AMADOR  [address] [place] [address]  Professional Phone: [phone]  Fax: [phone]  Website: [email or web address]      Evangelista Del Valle  Degree: AMADOR  [facility]  [address] [place] [address]    Mariona Mulet Pradera  Degree: AMADOR, MS  [facility] TMD, [facility] Medicine[facility]  [address] [place] [address]  Professional Phone: [phone]      Cinthya Jurado  Degree: AMADOR, MS  [facility]  [address]  [place] [address]  Professional Phone: [phone]    Orin Serrano  Degree: AMADOR [facility]  [address]  [place] [address]  Appointments: [phone]    Uche Lambert  Degree: DIANNS  [facility]  [address] [place] [address]  Professional Phone: [phone]  Website: [email or web address].[employer]      Franc Ling  Degree: AMADOR  [facility]  [address] [place] [address]  Professional Phone: [phone]  Fax: [phone]      Bal Ospina  Degree: AMADOR  [facility]  [address]  [address]  [place] [address]    Margarito Quintero   Degree: AMADOR   [facility]   [address]   [address]  [place] [address]  Phone [phone]  Website: [email or web address]                 ACCEPT MEDICARE  Selwyn Arthur DDS  [address], [place] [address] [phone]; [phone] (fax)  [facility]    Edin Henao DDS, MS   [facility]   [address]   [address]   [place] [address]   Appointments: [phone]   Fax: [phone]       ADDITIONAL PROVIDERS    Hermilo Gonzalez DDS   [address] [address]  Appointments: [phone]                 Some things to remember:  -Oral devices are often, but not always, covered by your medical insurance. Be sure to check with your insurance provider.   -If you are referred for oral therapy, you will be given a list of specialized dentists to consider or you may choose to visit the Web site of the American Academy of Dental Sleep Medicine  -Oral devices are less likely to work if you have severe sleep apnea or are extremely overweight.     More detailed information  An oral appliance is a small acrylic device that fits over the upper and lower teeth  (similar to a retainer or a mouth guard). This device slightly moves jaw forward, which moves the base of the tongue forward, opens the airway, improves breathing for effective treat snoring and obstructive sleep apnea in perhaps 7 out of 10  people .  The best working devices are custom-made by a dental device  after a mold is made of the teeth 1, 2, 3.  When is an oral appliance indicated?  Oral appliance therapy is recommended as a first-line treatment for patients with primary snoring, mild sleep apnea, and for patients with moderate sleep apnea who prefer appliance therapy to use of CPAP4, 5. Severity of sleep apnea is determined by sleep testing and is based on the number of respiratory events per hour of sleep.   How successful is oral appliance therapy?  The success rate of oral appliance therapy in patients with mild sleep apnea is 75-80% while in patients with moderate sleep apnea it is 50-70%. The chance of success in patients with severe sleep apnea is 40-50%. The research also shows that oral appliances have a beneficial effect on the cardiovascular health of BERONICA patients at the same magnitude as CPAP therapy7.  Oral appliances should be a second-line treatment in cases of severe sleep apnea, but if not completely successful then a combination therapy utilizing CPAP plus oral appliance therapy may be effective. Oral appliances tend to be effective in a broad range of patients although studies show that the patients who have the highest success are females, younger patients, those with milder disease, and less severe obesity. 3, 6.   Finding a dentist that practices dental sleep medicine  Specific training is available through the American Academy of Dental Sleep Medicine for dentists interested in working in the field of sleep. To find a dentist who is educated in the field of sleep and the use of oral appliances, near you, visit the Web site of the American Academy of Dental Sleep Medicine.    References  1. Cassandra et al. Objectively measured vs self-reported compliance during oral appliance therapy for sleep-disordered breathing. Chest 2013; 144(5): 7067-3410.  2. Trevor et al. Objective measurement of compliance  during oral appliance therapy for sleep-disordered breathing. Thorax 2013; 68(1): 91-96.  3. Annie, et al. Mandibular advancement devices in 620 men and women with BERONICA and snoring: tolerability and predictors of treatment success. Chest 2004; 125: 9155-4030.  4. Javed et al. Oral appliances for snoring and BERONICA: a review. Sleep 2006; 29: 244-262.  5. Enrique et al. Oral appliance treatment for BERONICA: an update. J Clin Sleep Med 2014; 10(2): 215-227.  6. Dilip et al. Predictors of OSAH treatment outcome. J Dent Res 2007; 86: 4947-7307.      Weight Loss:   Your Body mass index is 21.78 kg/m .    Being overweight does not necessarily mean you will have health consequences.  Those who have BMI over 35 or over 27 with existing medical conditions carries greater risk.   Weight loss decreases severity of sleep apnea in most people with obesity. For those with mild obesity who have developed snoring with weight gain, even 15-30 pound weight loss can improve and occasionally milder eliminate sleep apnea.  Structured and life-long dietary and health habits are necessary to lose weight and keep healthier weight levels.     The Comprehensive Weight loss program offers all aspects of weight loss strategies including two Non-Surgical Weight Loss Programs: Medical Weight Management and our 24 Week Healthy Lifestyle Program:    Medical Weight Management: You will meet with a Medical Weight Management Provider, as well as a Registered Dietician. The program may include medication therapy, dietary education, recommended exercise and physical therapy programs, monthly support group meetings, and possible psychological counseling. Follow up visits with the provider or dietician are scheduled based on your progress and needs.    24 Week Healthy Lifestyle Program: This unique program is designed to give you the support of weekly appointments and activities thru a 24-week period. It may include all of the components of the  basic program (above), with the addition of 11 individual Health  Visits, 24-week access to the [employer] website for over 700 online classes, and monthly support group meetings. This program has an out-of-pocket expense of $499 to cover the items that can not be billed to insurance (health coaches and [employer] access), and is non-refundable/non-transferable (you may be able to use a Health Savings Account; ask your HSA provider). There may be an optional meal replacement plan prescribed as well.   Surgical management achieves meaningful long-term weight loss and improvement in health risks in most patients with more severe obesity.      Sleep Apnea Surgery:    Surgery for obstructive sleep apnea is considered generally only when other therapies fail to work. Surgery may be discussed with you if you are having a difficult time tolerating CPAP and or when there is an abnormal structure that requires surgical correction.  Nose and throat surgeries often enlarge the airway to prevent collapse.  Most of these surgeries create pain for 1-2 weeks and up to half of the most common surgeries are not effective throughout life.  You should carefully discuss the benefits and drawbacks to surgery with your sleep provider and surgeon to determine if it is the best solution for you.   More information  Surgery for BERONICA is directed at areas that are responsible for narrowing or complete obstruction of the airway during sleep.  There are a wide range of procedures available to enlarge and/or stabilize the airway to prevent blockage of breathing in the three major areas where it can occur: the palate, tongue, and nasal regions.  Successful surgical treatment depends on the accurate identification of the factors responsible for obstructive sleep apnea in each person.  A personalized approach is required because there is no single treatment that works well for everyone.  Because of anatomic variation, consultation with an  examination by a sleep surgeon is a critical first step in determining what surgical options are best for each patient.  In some cases, examination during sedation may be recommended in order to guide the selection of procedures.  Patients will be counseled about risks and benefits as well as the typical recovery course after surgery. Surgery is typically not a cure for a person s BERONICA.  However, surgery will often significantly improve one s BERONICA severity (termed  success rate ).  Even in the absence of a cure, surgery will decrease the cardiovascular risk associated with OSA7; improve overall quality of life8 (sleepiness, functionality, sleep quality, etc).      Palate Procedures:  Patients with BERONICA often have narrowing of their airway in the region of their tonsils and uvula.  The goals of palate procedures are to widen the airway in this region as well as to help the tissues resist collapse.  Modern palate procedure techniques focus on tissue conservation and soft tissue rearrangement, rather than tissue removal.  Often the uvula is preserved in this procedure. Residual sleep apnea is common in patient after pharyngoplasty with an average reduction in sleep apnea events of 33%2.      Tongue Procedures:  ExamWhile patients are awake, the muscles that surround the throat are active and keep this region open for breathing. These muscles relax during sleep, allowing the tongue and other structures to collapse and block breathing.  There are several different tongue procedures available.  Selection of a tongue base procedure depends on characteristics seen on physical exam.  Generally, procedures are aimed at removing bulky tissues in this area or preventing the back of the tongue from falling back during sleep.  Success rates for tongue surgery range from 50-62%3.    Hypoglossal Nerve Stimulation:  Hypoglossal nerve stimulation has recently received approval from the [place] Food and Drug Administration for the  treatment of obstructive sleep apnea.  This is based on research showing that the system was safe and effective in treating sleep apnea6.  Results showed that the median AHI score decreased 68%, from 29.3 to 9.0. This therapy uses an implant system that senses breathing patterns and delivers mild stimulation to airway muscles, which keeps the airway open during sleep.  The system consists of three fully implanted components: a small generator (similar in size to a pacemaker), a breathing sensor, and a stimulation lead.  Using a small handheld remote, a patient turns the therapy on before bed and off upon awakening.    Candidates for this device must be greater than 18 years of age, have moderate to severe obstructive sleep apnea with less than 25% central events  (AHI between 15-65), BMI less than 35, have tried CPAP/oral appliance for at least 8 weeks without success, and have appropriate upper airway anatomy (determined by a sleep endoscopy performed by Dr. Bobby Lyons or Dr. Freddy Mendoza).    Nasal Procedures:  Nasal obstruction can interfere with nasal breathing during the day and night.  Studies have shown that relief of nasal obstruction can improve the ability of some patients to tolerate positive airway pressure therapy for obstructive sleep apnea1.  Treatment options include medications such as nasal saline, topical corticosteroid and antihistamine sprays, and oral medications such as antihistamines or decongestants. Non-surgical treatments can include external nasal dilators for selected patients. If these are not successful by themselves, surgery can improve the nasal airway either alone or in combination with these other options.        Combination Procedures:  Combination of surgical procedures and other treatments may be recommended, particularly if patients have more than one area of narrowing or persistent positional disease.  The success rate of combination surgery ranges from 66-80%2,3.     References  Shakira PHILLIPS. The Role of the Nose in Snoring and Obstructive Sleep Apnoea: An Update.  Eur Arch Otorhinolaryngol. 2011; 268: 1365-73.   Sabrina SM; Scooter JA; Shari JR; Pallanch JF; Isabel MB; Araceli SG; Cornell BARRY. Surgical modifications of the upper airway for obstructive sleep apnea in adults: a systematic review and meta-analysis. SLEEP 2010;33(10):5224-0951. Melvin LEMUS. Hypopharyngeal surgery in obstructive sleep apnea: an evidence-based medicine review.  Arch Otolaryngol Head Neck Surg. 2006 Feb;132(2):206-13.  Ger YH1, Tammy Y, Neri MORRIS. The efficacy of anatomically based multilevel surgery for obstructive sleep apnea. Otolaryngol Head Neck Surg. 2003 Oct;129(4):327-35.  Melvin LEMUS, Goldberg A. Hypopharyngeal Surgery in Obstructive Sleep Apnea: An Evidence-Based Medicine Review. Arch Otolaryngol Head Neck Surg. 2006 Feb;132(2):206-13.  Guido MCKENNA et al. Upper-Airway Stimulation for Obstructive Sleep Apnea.  N Engl J Med. 2014 Jan 9;370(2):139-49.  Peolesya Y et al. Increased Incidence of Cardiovascular Disease in Middle-aged Men with Obstructive Sleep Apnea. Am J Respir Crit Care Med; 2002 166: 159-165  Jordan EM et al. Studying Life Effects and Effectiveness of Palatopharyngoplasty (SLEEP) study: Subjective Outcomes of Isolated Uvulopalatopharyngoplasty. Otolaryngol Head Neck Surg. 2011; 144: 623-631.        WHAT IF I ONLY HAVE SNORING?    Mandibular advancement devices, lateral sleep positioning, long-term weight loss and treatment of nasal allergies have been shown to improve snoring.  Exercising tongue muscles with a game (https://apps.[employer]/Tour Engine/oj/soundly-reduce-snoring/hx0196825041) or stimulating the tongue during the day with a device (https://doi.org/10.3390/rqv09114367) have improved snoring in some individuals.  https://www.[employer].JobConvo/  https://www.sleepfoundation.org/best-anti-snoring-mouthpieces-and-mouthguards    Remember to Drive Safe... Drive Alive     Sleep health  profoundly affects your health, mood, and your safety.  Thirty three percent of the population (one in three of us) is not getting enough sleep and many have a sleep disorder. Not getting enough sleep or having an untreated / undertreated sleep condition may make us sleepy without even knowing it. In fact, our driving could be dramatically impaired due to our sleep health. As your provider, here are some things I would like you to know about driving:     Here are some warning signs for impairment and dangerous drowsy driving:              -Having been awake more than 16 hours               -Looking tired               -Eyelid drooping              -Head nodding (it could be too late at this point)              -Driving for more than 30 minutes     Some things you could do to make the driving safer if you are experiencing some drowsiness:              -Stop driving and rest              -Call for transportation              -Make sure your sleep disorder is adequately treated     Some things that have been shown NOT to work when experiencing drowsiness while driving:              -Turning on the radio              -Opening windows              -Eating any  distracting  /  entertaining  foods (e.g., sunflower seeds, candy, or any other)              -Talking on the phone      Your decision may not only impact your life, but also the life of others. Please, remember to drive safe for yourself and all of us.

## 2024-03-29 ASSESSMENT — SLEEP AND FATIGUE QUESTIONNAIRES
HOW LIKELY ARE YOU TO NOD OFF OR FALL ASLEEP WHILE SITTING QUIETLY AFTER LUNCH WITHOUT ALCOHOL: WOULD NEVER DOZE
HOW LIKELY ARE YOU TO NOD OFF OR FALL ASLEEP IN A CAR, WHILE STOPPED FOR A FEW MINUTES IN TRAFFIC: WOULD NEVER DOZE
HOW LIKELY ARE YOU TO NOD OFF OR FALL ASLEEP WHILE SITTING AND TALKING TO SOMEONE: WOULD NEVER DOZE
HOW LIKELY ARE YOU TO NOD OFF OR FALL ASLEEP WHILE SITTING INACTIVE IN A PUBLIC PLACE: SLIGHT CHANCE OF DOZING
HOW LIKELY ARE YOU TO NOD OFF OR FALL ASLEEP WHILE LYING DOWN TO REST IN THE AFTERNOON WHEN CIRCUMSTANCES PERMIT: MODERATE CHANCE OF DOZING
HOW LIKELY ARE YOU TO NOD OFF OR FALL ASLEEP WHILE WATCHING TV: SLIGHT CHANCE OF DOZING
HOW LIKELY ARE YOU TO NOD OFF OR FALL ASLEEP WHEN YOU ARE A PASSENGER IN A CAR FOR AN HOUR WITHOUT A BREAK: SLIGHT CHANCE OF DOZING
HOW LIKELY ARE YOU TO NOD OFF OR FALL ASLEEP WHILE SITTING AND READING: MODERATE CHANCE OF DOZING

## 2024-04-04 ENCOUNTER — OFFICE VISIT (OUTPATIENT)
Dept: SLEEP MEDICINE | Facility: CLINIC | Age: 69
End: 2024-04-04
Payer: COMMERCIAL

## 2024-04-04 DIAGNOSIS — G47.33 OSA (OBSTRUCTIVE SLEEP APNEA): ICD-10-CM

## 2024-04-04 PROCEDURE — G0399 HOME SLEEP TEST/TYPE 3 PORTA: HCPCS | Performed by: INTERNAL MEDICINE

## 2024-04-05 ENCOUNTER — DOCUMENTATION ONLY (OUTPATIENT)
Dept: SLEEP MEDICINE | Facility: CLINIC | Age: 69
End: 2024-04-05
Payer: COMMERCIAL

## 2024-04-05 NOTE — PROGRESS NOTES
HST POST-STUDY QUESTIONNAIRE    What time did you go to bed?  10:25  How long do you think it took to fall asleep?  1/2 hour or longer  What time did you wake up to start the day?  6:15  Did you get up during the night at all?  yes  If you woke up, do you remember approximately what time(s)? 11ish, 1:30, early am, not sure of the time  Did you have any difficulty with the equipment?  Yes it was tangled and I used it that way.  Did you us any type of treatment with this study?  Other mouth guard  Was the head of the bed elevated? No  Did you sleep in a recliner?  No  Did you stop using CPAP at least 3 days before this test?  NA  Any other information you'd like us to know? I had a hard time getting to sleep and staying asleep with this equipment.

## 2024-04-06 NOTE — PROGRESS NOTES
This HSAT was performed using a Noxturnal T3 device which recorded snore, sound, movement activity, body position, nasal pressure, oronasal thermal airflow, pulse, oximetry and both chest and abdominal respiratory effort. HSAT data was restricted to the time patient states they were in bed.     HSAT was scored using 1B 4% hypopnea rule.     AHI: 5.1.  Snoring was reported as moderate.  Time with SpO2 below 89% was 5 minutes.   Overall signal quality was good     Pt will follow up with sleep provider to determine appropriate therapy.     Ordering Provider, Popeye Hamlin MD C. Oyugi, BA, RPSGT, RST System Clinical Specialist/ 4/6/2024

## 2024-04-09 NOTE — PROCEDURES
"HOME SLEEP STUDY INTERPRETATION        Patient: Cinthya Higgins  MRN: [id number]  YOB: 1955  Study Date: 4/4/2024  PCP/Referring Provider: Venecia Torres;   Ordering Provider:   Popeye Hamlin MD         Indications for Home Study: Cinthya Higgins is a 68 year old female with a history of allergic rhinitis who presents with symptoms suggestive of obstructive sleep apnea.    Estimated body mass index is 21.78 kg/m  as calculated from the following:    Height as of 3/6/24: 1.613 m (5' 3.5\").    Weight as of 3/6/24: 56.7 kg (124 lb 14.4 oz).  Total score - [place]: 7 (3/29/2024  3:20 PM)  STOP-BANG: 3/8        Data: A full night home sleep study was performed recording the standard physiologic parameters including body position, movement, sound, nasal pressure, thermal oral airflow, chest and abdominal movements with respiratory inductance plethysmography, and oxygen saturation by pulse oximetry. Pulse rate was estimated by oximetry recording. This study was considered adequate based on > 4 hours of quality oximetry and respiratory recording. As specified by the AASM Manual for the Scoring of Sleep and Associated events, version 2.3, Rule VIII.D 1B, 4% oxygen desaturation scoring for hypopneas is used as a standard of care on all home sleep apnea testing.        Analysis Time: 437 minutes        Respiration:   Sleep Associated Hypoxemia: sustained hypoxemia was not present. Baseline oxygen saturation was 97%.  Time with saturation less than or equal to 88% was 3 minutes. The lowest oxygen saturation was 81%.   Snoring: Snoring was present 10% of time at 70 dB.  Respiratory events: The home study revealed a presence of 26 obstructive apneas and 2 mixed and central apneas. There were 9 hypopneas resulting in a combined apnea/hypopnea index [AHI] of 5.1 events per hour.  AHI was 31 per hour supine, - per hour prone, 1 per hour on left side, and 0 per hour on right side.   Pattern: Excluding events noted " above, respiratory rate and pattern was Normal.      Position: Percent of time spent: supine -14%, prone - 0%, on left -50%, on right -36%.      Heart Rate: By pulse oximetry normal rate was noted.       Assessment:   Mild obstructive sleep apnea.  Sleep associated hypoxemia was not present.    Recommendations:  Consider oral appliance therapy or positional therapy.  Suggest optimizing sleep hygiene and avoiding sleep deprivation.  Weight management.        Diagnosis Code(s): Obstructive Sleep Apnea G47.33, Snoring R06.83    Electronically signed by: MATHEW PERES MD, April 9, 2024   Diplomate, American Board of Internal Medicine, Sleep Medicine

## 2024-05-29 ENCOUNTER — PATIENT OUTREACH (OUTPATIENT)
Dept: CARE COORDINATION | Facility: CLINIC | Age: 69
End: 2024-05-29
Payer: COMMERCIAL

## 2024-05-30 ENCOUNTER — MYC MEDICAL ADVICE (OUTPATIENT)
Dept: FAMILY MEDICINE | Facility: CLINIC | Age: 69
End: 2024-05-30
Payer: COMMERCIAL

## 2024-05-30 DIAGNOSIS — Z13.1 SCREENING FOR DIABETES MELLITUS: ICD-10-CM

## 2024-05-30 DIAGNOSIS — Z13.0 SCREENING, ANEMIA, DEFICIENCY, IRON: ICD-10-CM

## 2024-05-30 DIAGNOSIS — Z13.220 SCREENING FOR HYPERLIPIDEMIA: Primary | ICD-10-CM

## 2024-06-03 NOTE — TELEPHONE ENCOUNTER
"Venecia-Please review and sign if agree.  Neither lab is due per review of health maintenance.    \"Hunter Cuadra,  I've scheduled my wellness visit for July 2nd and was hoping to get my bloodwork (cholesterol and glucose) done at [facility] in June so you had the results before the visit. Could you order the tests so that I can schedule them? Thank you! Pat\"    Writer responded via [place].    Thank you!  MELI NewellN, RN-[facility]        "

## 2024-06-10 ENCOUNTER — ANCILLARY PROCEDURE (OUTPATIENT)
Dept: MAMMOGRAPHY | Facility: CLINIC | Age: 69
End: 2024-06-10
Attending: PHYSICIAN ASSISTANT
Payer: COMMERCIAL

## 2024-06-10 DIAGNOSIS — Z12.31 VISIT FOR SCREENING MAMMOGRAM: ICD-10-CM

## 2024-06-10 PROCEDURE — 77067 SCR MAMMO BI INCL CAD: CPT | Mod: 26 | Performed by: RADIOLOGY

## 2024-06-10 PROCEDURE — 77063 BREAST TOMOSYNTHESIS BI: CPT

## 2024-06-10 PROCEDURE — 77063 BREAST TOMOSYNTHESIS BI: CPT | Mod: 26 | Performed by: RADIOLOGY

## 2024-06-20 ENCOUNTER — LAB (OUTPATIENT)
Dept: LAB | Facility: CLINIC | Age: 69
End: 2024-06-20
Payer: COMMERCIAL

## 2024-06-20 DIAGNOSIS — Z13.220 SCREENING FOR HYPERLIPIDEMIA: ICD-10-CM

## 2024-06-20 DIAGNOSIS — Z13.0 SCREENING, ANEMIA, DEFICIENCY, IRON: ICD-10-CM

## 2024-06-20 DIAGNOSIS — Z13.1 SCREENING FOR DIABETES MELLITUS: ICD-10-CM

## 2024-06-20 LAB
ANION GAP SERPL CALCULATED.3IONS-SCNC: 10 MMOL/L (ref 7–15)
BUN SERPL-MCNC: 12.4 MG/DL (ref 8–23)
CALCIUM SERPL-MCNC: 9.2 MG/DL (ref 8.8–10.2)
CHLORIDE SERPL-SCNC: 106 MMOL/L (ref 98–107)
CHOLEST SERPL-MCNC: 229 MG/DL
CREAT SERPL-MCNC: 0.69 MG/DL (ref 0.51–0.95)
DEPRECATED HCO3 PLAS-SCNC: 25 MMOL/L (ref 22–29)
EGFRCR SERPLBLD CKD-EPI 2021: >90 ML/MIN/1.73M2
ERYTHROCYTE [DISTWIDTH] IN BLOOD BY AUTOMATED COUNT: 12 % (ref 10–15)
FASTING STATUS PATIENT QL REPORTED: YES
FASTING STATUS PATIENT QL REPORTED: YES
GLUCOSE SERPL-MCNC: 94 MG/DL (ref 70–99)
HCT VFR BLD AUTO: 41.2 % (ref 35–47)
HDLC SERPL-MCNC: 104 MG/DL
HGB BLD-MCNC: 13 G/DL (ref 11.7–15.7)
LDLC SERPL CALC-MCNC: 112 MG/DL
MCH RBC QN AUTO: 31.8 PG (ref 26.5–33)
MCHC RBC AUTO-ENTMCNC: 31.6 G/DL (ref 31.5–36.5)
MCV RBC AUTO: 101 FL (ref 78–100)
NONHDLC SERPL-MCNC: 125 MG/DL
PLATELET # BLD AUTO: 198 10E3/UL (ref 150–450)
POTASSIUM SERPL-SCNC: 4.3 MMOL/L (ref 3.4–5.3)
RBC # BLD AUTO: 4.09 10E6/UL (ref 3.8–5.2)
SODIUM SERPL-SCNC: 141 MMOL/L (ref 135–145)
TRIGL SERPL-MCNC: 67 MG/DL
WBC # BLD AUTO: 4.6 10E3/UL (ref 4–11)

## 2024-06-20 PROCEDURE — 80048 BASIC METABOLIC PNL TOTAL CA: CPT

## 2024-06-20 PROCEDURE — 36415 COLL VENOUS BLD VENIPUNCTURE: CPT

## 2024-06-20 PROCEDURE — 85027 COMPLETE CBC AUTOMATED: CPT

## 2024-06-20 PROCEDURE — 80061 LIPID PANEL: CPT

## 2024-06-30 SDOH — HEALTH STABILITY: PHYSICAL HEALTH: ON AVERAGE, HOW MANY MINUTES DO YOU ENGAGE IN EXERCISE AT THIS LEVEL?: 30 MIN

## 2024-06-30 SDOH — HEALTH STABILITY: PHYSICAL HEALTH: ON AVERAGE, HOW MANY DAYS PER WEEK DO YOU ENGAGE IN MODERATE TO STRENUOUS EXERCISE (LIKE A BRISK WALK)?: 5 DAYS

## 2024-06-30 ASSESSMENT — SOCIAL DETERMINANTS OF HEALTH (SDOH): HOW OFTEN DO YOU GET TOGETHER WITH FRIENDS OR RELATIVES?: TWICE A WEEK

## 2024-07-02 ENCOUNTER — OFFICE VISIT (OUTPATIENT)
Dept: FAMILY MEDICINE | Facility: CLINIC | Age: 69
End: 2024-07-02
Payer: COMMERCIAL

## 2024-07-02 VITALS
BODY MASS INDEX: 21.79 KG/M2 | SYSTOLIC BLOOD PRESSURE: 135 MMHG | TEMPERATURE: 98 F | HEART RATE: 104 BPM | OXYGEN SATURATION: 100 % | RESPIRATION RATE: 15 BRPM | DIASTOLIC BLOOD PRESSURE: 86 MMHG | WEIGHT: 123 LBS | HEIGHT: 63 IN

## 2024-07-02 DIAGNOSIS — Z00.00 ENCOUNTER FOR MEDICARE ANNUAL WELLNESS EXAM: Primary | ICD-10-CM

## 2024-07-02 DIAGNOSIS — E78.00 PURE HYPERCHOLESTEROLEMIA: ICD-10-CM

## 2024-07-02 DIAGNOSIS — M81.0 AGE-RELATED OSTEOPOROSIS WITHOUT CURRENT PATHOLOGICAL FRACTURE: ICD-10-CM

## 2024-07-02 PROBLEM — M89.9 DISORDER OF BONE AND CARTILAGE: Status: RESOLVED | Noted: 2022-11-10 | Resolved: 2024-07-02

## 2024-07-02 PROBLEM — M94.9 DISORDER OF BONE AND CARTILAGE: Status: RESOLVED | Noted: 2022-11-10 | Resolved: 2024-07-02

## 2024-07-02 PROCEDURE — G0439 PPPS, SUBSEQ VISIT: HCPCS | Performed by: PHYSICIAN ASSISTANT

## 2024-07-02 RX ORDER — RESPIRATORY SYNCYTIAL VIRUS VACCINE 120MCG/0.5
0.5 KIT INTRAMUSCULAR ONCE
Qty: 1 EACH | Refills: 0 | Status: CANCELLED | OUTPATIENT
Start: 2024-07-02 | End: 2024-07-02

## 2024-07-02 ASSESSMENT — PAIN SCALES - GENERAL: PAINLEVEL: NO PAIN (0)

## 2024-07-02 NOTE — PATIENT INSTRUCTIONS
"2400mg of red yeast rice extract   A [place] Pleasure.R2G or something similaraa    Patient Education   Preventive Care Advice   This is general advice we often give to help people stay healthy. Your care team may have specific advice just for you. Please talk to your care team about your own preventive care needs.  Lifestyle  Exercise at least 150 minutes each week (30 minutes a day, 5 days a week).  Do muscle strengthening activities 2 days a week. These help control your weight and prevent disease.  No smoking.  Wear sunscreen to prevent skin cancer.  Have your home tested for radon every 2 to 5 years. Radon is a colorless, odorless gas that can harm your lungs. To learn more, go to www.health.[facility].mn.us and search for \"Radon in Homes.\"  Keep guns unloaded and locked up in a safe place like a safe or gun vault, or, use a gun lock and hide the keys. Always lock away bullets separately. To learn more, visit [employer].mn.gov and search for \"safe gun storage.\"  Nutrition  Eat 5 or more servings of fruits and vegetables each day.  Try wheat bread, brown rice and whole grain pasta (instead of white bread, rice, and pasta).  Get enough calcium and vitamin D. Check the label on foods and aim for 100% of the RDA (recommended daily allowance).  Regular exams  Have a dental exam and cleaning every 6 months.  See your health care team every year to talk about:  Any changes in your health.  Any medicines your care team has prescribed.  Preventive care, family planning, and ways to prevent chronic diseases.  Shots (vaccines)   HPV shots (up to age 26), if you've never had them before.  Hepatitis B shots (up to age 59), if you've never had them before.  COVID-19 shot: Get this shot when it's due.  Flu shot: Get a flu shot every year.  Tetanus shot: Get a tetanus shot every 10 years.  Pneumococcal, hepatitis A, and RSV shots: Ask your care team if you need these based on your risk.  Shingles shot (for age 50 and up).  General health " tests  Diabetes screening:  Starting at age 35, Get screened for diabetes at least every 3 years.  If you are younger than age 35, ask your care team if you should be screened for diabetes.  Cholesterol test: At age 39, start having a cholesterol test every 5 years, or more often if advised.  Bone density scan (DEXA): At age 50, ask your care team if you should have this scan for osteoporosis (brittle bones).  Hepatitis C: Get tested at least once in your life.  Abdominal aortic aneurysm screening: Talk to your doctor about having this screening if you:  Have ever smoked; and  Are biologically male; and  Are between the ages of 65 and 75.  STIs (sexually transmitted infections)  Before age 24: Ask your care team if you should be screened for STIs.  After age 24: Get screened for STIs if you're at risk. You are at risk for STIs (including HIV) if:  You are sexually active with more than one person.  You don't use condoms every time.  You or a partner was diagnosed with a sexually transmitted infection.  If you are at risk for HIV, ask about PrEP medicine to prevent HIV.  Get tested for HIV at least once in your life, whether you are at risk for HIV or not.  Cancer screening tests  Cervical cancer screening: If you have a cervix, begin getting regular cervical cancer screening tests at age 21. Most people who have regular screenings with normal results can stop after age 65. Talk about this with your provider.  Breast cancer scan (mammogram): If you've ever had breasts, begin having regular mammograms starting at age 40. This is a scan to check for breast cancer.  Colon cancer screening: It is important to start screening for colon cancer at age 45.  Have a colonoscopy test every 10 years (or more often if you're at risk) Or, ask your provider about stool tests like a FIT test every year or Cologuard test every 3 years.  To learn more about your testing options, visit: www.[employer].Goomeo/910650.pdf.  For help making a  decision, visit: rom/vu69639.  Prostate cancer screening test: If you have a prostate and are age 55 to 69, ask your provider if you would benefit from a yearly prostate cancer screening test.  Lung cancer screening: If you are a current or former smoker age 50 to 80, ask your care team if ongoing lung cancer screenings are right for you.  For informational purposes only. Not to replace the advice of your health care provider. Copyright   2023 [place] [employer]. All rights reserved. Clinically reviewed by the  [facility] [place]. [employer] [id number] - REV 04/24.  Hearing Loss: Care Instructions  Overview     Hearing loss is a sudden or slow decrease in how well you hear. It can range from slight to profound. Permanent hearing loss can occur with aging. It also can happen when you are exposed long-term to loud noise. Examples include listening to loud music, riding motorcycles, or being around other loud machines.  Hearing loss can affect your work and home life. It can make you feel lonely or depressed. You may feel that you have lost your independence. But hearing aids and other devices can help you hear better and feel connected to others.  Follow-up care is a key part of your treatment and safety. Be sure to make and go to all appointments, and call your doctor if you are having problems. It's also a good idea to know your test results and keep a list of the medicines you take.  How can you care for yourself at home?  Avoid loud noises whenever possible. This helps keep your hearing from getting worse.  Always wear hearing protection around loud noises.  Wear a hearing aid as directed.  A professional can help you pick a hearing aid that will work best for you.  You can also get hearing aids over the counter for mild to moderate hearing loss.  Have hearing tests as your doctor suggests. They can show whether your hearing has changed. Your hearing aid may need to be adjusted.  Use other  "devices as needed. These may include:  Telephone amplifiers and hearing aids that can connect to a television, stereo, radio, or microphone.  Devices that use lights or vibrations. These alert you to the doorbell, a ringing telephone, or a baby monitor.  Television closed-captioning. This shows the words at the bottom of the screen. Most new TVs can do this.  TTY (text telephone). This lets you type messages back and forth on the telephone instead of talking or listening. These devices are also called TDD. When messages are typed on the keyboard, they are sent over the phone line to a receiving TTY. The message is shown on a monitor.  Use text messaging, social media, and email if it is hard for you to communicate by telephone.  Try to learn a listening technique called speechreading. It is not lipreading. You pay attention to people's gestures, expressions, posture, and tone of voice. These clues can help you understand what a person is saying. Face the person you are talking to, and have them face you. Make sure the lighting is good. You need to see the other person's face clearly.  Think about counseling if you need help to adjust to your hearing loss.  When should you call for help?  Watch closely for changes in your health, and be sure to contact your doctor if:    You think your hearing is getting worse.     You have new symptoms, such as dizziness or nausea.   Where can you learn more?  Go to https://www.[employer].net/patiented  Enter R798 in the search box to learn more about \"Hearing Loss: Care Instructions.\"  Current as of: September 27, 2023               Content Version: 14.0    [id number] [employer].   Care instructions adapted under license by your healthcare professional. If you have questions about a medical condition or this instruction, always ask your healthcare professional. [employer] disclaims any warranty or liability for your use of this information.         "

## 2024-07-02 NOTE — PROGRESS NOTES
Preventive Care Visit  [facility] [place]  Venecia Torres PA-C, Physician Assistant - Medical  Jul 2, 2024      Assessment & Plan     Encounter for Medicare annual wellness exam  - REVIEW OF HEALTH MAINTENANCE PROTOCOL ORDERS    Age-related osteoporosis without current pathological fracture - discussed appropriate lifestyle & supplements, due for repeat DEXA.   - DX Bone Density    Pure hypercholesterolemia - discussed lifestyle encouraged statin but she declines. Did discuss red yeast rice extract.    Counseling  Appropriate preventive services were discussed with this patient, including applicable screening as appropriate for fall prevention, nutrition, physical activity, Tobacco-use cessation, weight loss and cognition.  Checklist reviewing preventive services available has been given to the patient.  Reviewed patient's diet, addressing concerns and/or questions.   The patient was provided with written information regarding signs of hearing loss.         Subjective   Pat is a 68 year old, presenting for the following:  Annual Visit (AWV)        7/2/2024     2:03 PM   Additional Questions   Roomed by Venecia Whitten here today for AWV    Aunt has h/o of jaw necrosis from bisphosphonate  Wouldn't be interested in fosamax   Weight training  Takes high quality multivitamin with calcium BID  Any higher and gets kidney stones      Health Care Directive  Patient does not have a Health Care Directive or Living Will: Discussed advance care planning with patient; however, patient declined at this time.    HPI      6/30/2024   General Health   How would you rate your overall physical health? Good   Feel stress (tense, anxious, or unable to sleep) Not at all            6/30/2024   Nutrition   Diet: Regular (no restrictions)            6/30/2024   Exercise   Days per week of moderate/strenous exercise 5 days   Average minutes spent exercising at this level 30 min            6/30/2024   Social Factors    Frequency of gathering with friends or relatives Twice a week   Worry food won't last until get money to buy more No   Food not last or not have enough money for food? No   Do you have housing? (Housing is defined as stable permanent housing and does not include staying ouside in a car, in a tent, in an abandoned building, in an overnight shelter, or couch-surfing.) Yes   Are you worried about losing your housing? No   Lack of transportation? No   Unable to get utilities (heat,electricity)? No            6/30/2024   Fall Risk   Fallen 2 or more times in the past year? No    No   Trouble with walking or balance? No    No       Multiple values from one day are sorted in reverse-chronological order          6/30/2024   Activities of Daily Living- Home Safety   Needs help with the following daily activites None of the above   Safety concerns in the home None of the above            6/30/2024   Dental   Dentist two times every year? Yes            6/30/2024   Hearing Screening   Hearing concerns? (!) IT'S HARD TO FOLLOW A CONVERSATION IN A NOISY RESTAURANT OR CROWDED ROOM.            6/30/2024   Driving Risk Screening   Patient/family members have concerns about driving No            6/30/2024   General Alertness/Fatigue Screening   Have you been more tired than usual lately? No            6/30/2024   Urinary Incontinence Screening   Bothered by leaking urine in past 6 months No            6/30/2024   TB Screening   Were you born outside of the [place]? No            Today's PHQ-2 Score:       7/2/2024     2:03 PM   PHQ-2 ( 1999 [employer])   Q1: Little interest or pleasure in doing things 0   Q2: Feeling down, depressed or hopeless 0   PHQ-2 Score 0   Q1: Little interest or pleasure in doing things Not at all   Q2: Feeling down, depressed or hopeless Not at all   PHQ-2 Score 0           6/30/2024   Substance Use   Alcohol more than 3/day or more than 7/wk No   Do you have a current opioid prescription? No   How severe/bad is  pain from 1 to 10? 0/10 (No Pain)   Do you use any other substances recreationally? No        Social History     Tobacco Use    Smoking status: Never     Passive exposure: Never    Smokeless tobacco: Never   Vaping Use    Vaping status: Never Used   Substance Use Topics    Alcohol use: Yes     Comment: infrequent - one glass of wine once or twice a month    Drug use: Never           6/10/2024   LAST FHS-7 RESULTS   1st degree relative breast or ovarian cancer No   Any relative bilateral breast cancer No   Any male have breast cancer No   Any ONE woman have BOTH breast AND ovarian cancer No   Any woman with breast cancer before 50yrs No   2 or more relatives with breast AND/OR ovarian cancer No   2 or more relatives with breast AND/OR bowel cancer No           Mammogram Screening - Mammogram every 1-2 years updated in Health Maintenance based on mutual decision making      History of abnormal Pap smear: No - age 65 or older with adequate negative prior screening test results (3 consecutive negative cytology results, 2 consecutive negative cotesting results, or 2 consecutive negative HrHPV test results within 10 years, with the most recent test occurring within the recommended screening interval for the test used)        Latest Ref Rng & Units 5/6/2021     3:43 PM 5/5/2021     3:25 PM 4/7/2017     9:03 AM   PAP / HPV   PAP    Negative for squamous intraepithelial lesion or malignancy  Electronically signed by Judy Cuello CT (ASCP) on 4/14/2017 at  2:24 PM      PAP (Historical)  NIL      HPV 16 DNA NEG^Negative  Negative     HPV 18 DNA NEG^Negative  Negative     Other HR HPV NEG^Negative  Negative       ASCVD Risk   The ASCVD Risk score (Octavia DK, et al., 2019) failed to calculate for the following reasons:    The valid HDL cholesterol range is 20 to 100 mg/dL        Reviewed and updated as needed this visit by Provider   Tobacco   Meds  Problems  Med Hx  Surg Hx  Fam Hx            Current  "providers sharing in care for this patient include:  Patient Care Team:  Venecia Torres PA-C as PCP - General (Physician Assistant - Medical)  Venecia Torres PA-C as Assigned PCP  Popeye Hamlin MD as Assigned Sleep Provider    The following health maintenance items are reviewed in Epic and correct as of today:  Health Maintenance   Topic Date Due    ZOSTER IMMUNIZATION (1 of 2) Never done    RSV VACCINE (Pregnancy & 60+) (1 - 1-dose 60+ series) Never done    DEXA  04/21/2019    COVID-19 Vaccine (6 - 2023-24 season) 01/26/2024    INFLUENZA VACCINE (1) 09/01/2024    MEDICARE ANNUAL WELLNESS VISIT  07/02/2025    ANNUAL REVIEW OF HM ORDERS  07/02/2025    FALL RISK ASSESSMENT  07/02/2025    MAMMO SCREENING  06/10/2026    GLUCOSE  06/20/2027    LIPID  06/20/2029    ADVANCE CARE PLANNING  07/02/2029    COLORECTAL CANCER SCREENING  09/09/2029    DTAP/TDAP/TD IMMUNIZATION (5 - Td or Tdap) 05/05/2031    HEPATITIS C SCREENING  Completed    PHQ-2 (once per calendar year)  Completed    Pneumococcal Vaccine: 65+ Years  Completed    IPV IMMUNIZATION  Aged Out    HPV IMMUNIZATION  Aged Out    MENINGITIS IMMUNIZATION  Aged Out    RSV MONOCLONAL ANTIBODY  Aged Out    PAP  Discontinued        Objective    Exam  /86 (BP Location: Right arm, Patient Position: Sitting, Cuff Size: Adult Regular)   Pulse 104   Temp 98  F (36.7  C) (Temporal)   Resp 15   Ht 1.61 m (5' 3.39\")   Wt 55.8 kg (123 lb)   LMP 08/08/2004   SpO2 100%   BMI 21.52 kg/m     Estimated body mass index is 21.52 kg/m  as calculated from the following:    Height as of this encounter: 1.61 m (5' 3.39\").    Weight as of this encounter: 55.8 kg (123 lb).    Physical Exam  GENERAL: alert and no distress  EYES: Eyes grossly normal to inspection, PERRL and conjunctivae and sclerae normal  HENT: ear canals and TM's normal, nose and mouth without ulcers or lesions  NECK: no adenopathy, no asymmetry, masses, or scars  RESP: lungs clear to auscultation - no rales, rhonchi " or wheezes  CV: regular rate and rhythm, normal S1 S2, no S3 or S4, no murmur, click or rub, no peripheral edema  ABDOMEN: soft, nontender, no hepatosplenomegaly, no masses and bowel sounds normal  MS: no gross musculoskeletal defects noted, no edema  SKIN: no suspicious lesions or rashes  NEURO: Normal strength and tone, mentation intact and speech normal  PSYCH: mentation appears normal, affect normal/bright        7/2/2024   Mini Cog   Clock Draw Score 2 Normal   3 Item Recall 3 objects recalled   Mini Cog Total Score 5                 Signed Electronically by: Venecia Torres PA-C

## 2024-07-23 ENCOUNTER — ANCILLARY PROCEDURE (OUTPATIENT)
Dept: BONE DENSITY | Facility: CLINIC | Age: 69
End: 2024-07-23
Attending: PHYSICIAN ASSISTANT
Payer: COMMERCIAL

## 2024-07-23 DIAGNOSIS — M81.0 AGE-RELATED OSTEOPOROSIS WITHOUT CURRENT PATHOLOGICAL FRACTURE: ICD-10-CM

## 2024-07-23 PROCEDURE — 77080 DXA BONE DENSITY AXIAL: CPT | Mod: TC

## 2024-08-05 ENCOUNTER — OFFICE VISIT (OUTPATIENT)
Dept: URGENT CARE | Facility: URGENT CARE | Age: 69
End: 2024-08-05
Payer: COMMERCIAL

## 2024-08-05 VITALS
TEMPERATURE: 97.3 F | OXYGEN SATURATION: 99 % | HEART RATE: 95 BPM | SYSTOLIC BLOOD PRESSURE: 137 MMHG | RESPIRATION RATE: 16 BRPM | DIASTOLIC BLOOD PRESSURE: 82 MMHG

## 2024-08-05 DIAGNOSIS — B37.31 VAGINAL CANDIDA: ICD-10-CM

## 2024-08-05 DIAGNOSIS — L03.011 PARONYCHIA OF FINGER OF RIGHT HAND: ICD-10-CM

## 2024-08-05 DIAGNOSIS — R31.9 HEMATURIA, UNSPECIFIED TYPE: Primary | ICD-10-CM

## 2024-08-05 LAB
ALBUMIN UR-MCNC: NEGATIVE MG/DL
APPEARANCE UR: CLEAR
BILIRUB UR QL STRIP: NEGATIVE
CLUE CELLS: ABNORMAL
COLOR UR AUTO: YELLOW
GLUCOSE UR STRIP-MCNC: NEGATIVE MG/DL
HGB UR QL STRIP: ABNORMAL
KETONES UR STRIP-MCNC: NEGATIVE MG/DL
LEUKOCYTE ESTERASE UR QL STRIP: NEGATIVE
NITRATE UR QL: NEGATIVE
PH UR STRIP: 6.5 [PH] (ref 5–7)
RBC #/AREA URNS AUTO: NORMAL /HPF
SP GR UR STRIP: 1.01 (ref 1–1.03)
TRICHOMONAS, WET PREP: ABNORMAL
UROBILINOGEN UR STRIP-ACNC: 0.2 E.U./DL
WBC #/AREA URNS AUTO: NORMAL /HPF
WBC'S/HIGH POWER FIELD, WET PREP: ABNORMAL
YEAST, WET PREP: PRESENT

## 2024-08-05 PROCEDURE — 87210 SMEAR WET MOUNT SALINE/INK: CPT | Performed by: PHYSICIAN ASSISTANT

## 2024-08-05 PROCEDURE — 81001 URINALYSIS AUTO W/SCOPE: CPT | Performed by: PHYSICIAN ASSISTANT

## 2024-08-05 PROCEDURE — 99204 OFFICE O/P NEW MOD 45 MIN: CPT | Performed by: PHYSICIAN ASSISTANT

## 2024-08-05 RX ORDER — FLUCONAZOLE 150 MG/1
150 TABLET ORAL ONCE
Qty: 1 TABLET | Refills: 0 | Status: SHIPPED | OUTPATIENT
Start: 2024-08-05 | End: 2024-08-05

## 2024-08-05 RX ORDER — SULFAMETHOXAZOLE/TRIMETHOPRIM 800-160 MG
1 TABLET ORAL 2 TIMES DAILY
Qty: 14 TABLET | Refills: 0 | Status: SHIPPED | OUTPATIENT
Start: 2024-08-05 | End: 2024-08-13

## 2024-08-05 ASSESSMENT — ENCOUNTER SYMPTOMS
FREQUENCY: 1
DYSURIA: 0

## 2024-08-05 NOTE — PROGRESS NOTES
SUBJECTIVE:   Cinthya Higgins is a 68 year old female presenting with a chief complaint of   Chief Complaint   Patient presents with    Urgent Care    UTI    finger problem     Patient presents with urinary frequency that started last night. Patient also notes a infection on her right pinky finger.        She is a new patient of [facility].  Patient presents with  urinary frequency since yesterday.  No fevers or back pain.  No dysuria.  (2)  finger infection off and on for summer.  Patient has been soaking the finger with vinegar and warm water, and topical bacitracin. No discharge.  No itching.  RHD.          Review of Systems   Genitourinary:  Positive for frequency. Negative for dysuria.   All other systems reviewed and are negative.      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, positive for HPV 16    Nephrolithiasis      Family History   Problem Relation Age of Onset    Hypertension Mother     Dementia Mother     Osteoporosis Mother     Arthritis Father     Cerebrovascular Disease Father         at age 85    Colon Cancer Father     Prostate Cancer Father         at age 85    No Known Problems Brother     No Known Problems Brother     Neurologic Disorder Maternal Grandmother         parkinson's disease    Cancer Maternal Grandfather         luekemia    Alzheimer Disease Paternal Grandmother     Lung Cancer Paternal Grandfather         smoker    Myocardial Infarction Paternal Grandfather         in his 50's    Breast Cancer No family hx of      Current Outpatient Medications   Medication Sig Dispense Refill    fish oil-omega-3 fatty acids 1000 MG capsule       fluconazole (DIFLUCAN) 150 MG tablet Take 1 tablet (150 mg) by mouth once for 1 dose 1 tablet 0    multivitamin w/minerals (MULTI-VITAMIN) tablet       sodium chloride (OCEAN) 0.65 % nasal spray Spray 1 spray into both nostrils daily as needed      sulfamethoxazole-trimethoprim (BACTRIM DS) 800-160 MG tablet Take 1  tablet by mouth 2 times daily for 7 days 14 tablet 0     Social History     Tobacco Use    Smoking status: Never     Passive exposure: Never    Smokeless tobacco: Never   Substance Use Topics    Alcohol use: Yes     Comment: infrequent - one glass of wine once or twice a month       OBJECTIVE  /82 (BP Location: Right arm)   Pulse 95   Temp 97.3  F (36.3  C) (Temporal)   Resp 16   LMP 08/08/2004   SpO2 99%     Physical Exam  Vitals and nursing note reviewed.   Constitutional:       Appearance: Normal appearance. She is normal weight.   Eyes:      Extraocular Movements: Extraocular movements intact.      Conjunctiva/sclera: Conjunctivae normal.   Cardiovascular:      Rate and Rhythm: Normal rate and regular rhythm.   Abdominal:      Tenderness: There is no right CVA tenderness or left CVA tenderness.   Skin:     General: Skin is warm and dry.      Comments: Erythema to right hand pinky cuticle - mild   Neurological:      Mental Status: She is alert.   Psychiatric:         Mood and Affect: Mood normal.         Behavior: Behavior normal.         Labs:  Results for orders placed or performed in visit on 08/05/24 (from the past 24 hour(s))   UA with Microscopic reflex to Culture - Clinic Collect    Specimen: Urine, Midstream   Result Value Ref Range    Color Urine Yellow Colorless, Straw, Light Yellow, Yellow    Appearance Urine Clear Clear    Glucose Urine Negative Negative mg/dL    Bilirubin Urine Negative Negative    Ketones Urine Negative Negative mg/dL    Specific Gravity Urine 1.015 1.003 - 1.035    Blood Urine Trace (A) Negative    pH Urine 6.5 5.0 - 7.0    Protein Albumin Urine Negative Negative mg/dL    Urobilinogen Urine 0.2 0.2, 1.0 E.U./dL    Nitrite Urine Negative Negative    Leukocyte Esterase Urine Negative Negative   UA Microscopic with Reflex to Culture   Result Value Ref Range    RBC Urine None Seen 0-2 /HPF /HPF    WBC Urine None Seen 0-5 /HPF /HPF    Narrative    Urine Culture not indicated    Wet prep - lab collect    Specimen: Vagina; Swab   Result Value Ref Range    Trichomonas Absent Absent    Yeast Present (A) Absent    Clue Cells Absent Absent    WBCs/high power field 2+ (A) None       ASSESSMENT:      ICD-10-CM    1. Hematuria, unspecified type  R31.9 Wet prep - lab collect     Wet prep - lab collect     UA Macroscopic with reflex to Microscopic and Culture - Lab Collect      2. Paronychia of finger of right hand  L03.011 sulfamethoxazole-trimethoprim (BACTRIM DS) 800-160 MG tablet      3. Vaginal candida  B37.31 fluconazole (DIFLUCAN) 150 MG tablet           Medical Decision Making:    Differential Diagnosis:  UTI, paronychia    Serious Comorbid Conditions:  Adult:   reviewed    PLAN:    Rx for diflucan.  Repeat UA in 2 weeks.  UA ordered.  If continued blood in urine, patient to follow up with PCP for appropriate workup.      Rx for bactrim if cuticle worsens, otherwise can continue to perform topical abx with vinegar soaks.      Followup:    If not improving or if condition worsens, follow up with your Primary Care Provider, If not improving or if conditions worsens over the next 12-24 hours, go to the Emergency Department    There are no Patient Instructions on file for this visit.

## 2024-08-13 ENCOUNTER — OFFICE VISIT (OUTPATIENT)
Dept: FAMILY MEDICINE | Facility: CLINIC | Age: 69
End: 2024-08-13
Payer: COMMERCIAL

## 2024-08-13 ENCOUNTER — NURSE TRIAGE (OUTPATIENT)
Dept: FAMILY MEDICINE | Facility: CLINIC | Age: 69
End: 2024-08-13

## 2024-08-13 VITALS
WEIGHT: 124.5 LBS | SYSTOLIC BLOOD PRESSURE: 130 MMHG | BODY MASS INDEX: 21.79 KG/M2 | TEMPERATURE: 97.8 F | RESPIRATION RATE: 21 BRPM | DIASTOLIC BLOOD PRESSURE: 80 MMHG | HEART RATE: 90 BPM | OXYGEN SATURATION: 100 %

## 2024-08-13 DIAGNOSIS — N81.11 CYSTOCELE, MIDLINE: ICD-10-CM

## 2024-08-13 DIAGNOSIS — Z23 NEED FOR SHINGLES VACCINE: ICD-10-CM

## 2024-08-13 DIAGNOSIS — R35.0 URINARY FREQUENCY: Primary | ICD-10-CM

## 2024-08-13 DIAGNOSIS — Z29.11 NEED FOR VACCINATION AGAINST RESPIRATORY SYNCYTIAL VIRUS: ICD-10-CM

## 2024-08-13 DIAGNOSIS — N90.89 VULVAR IRRITATION: ICD-10-CM

## 2024-08-13 LAB
ALBUMIN UR-MCNC: NEGATIVE MG/DL
APPEARANCE UR: CLEAR
BACTERIA #/AREA URNS HPF: ABNORMAL /HPF
BILIRUB UR QL STRIP: NEGATIVE
CLUE CELLS: ABNORMAL
COLOR UR AUTO: YELLOW
GLUCOSE UR STRIP-MCNC: NEGATIVE MG/DL
HGB UR QL STRIP: ABNORMAL
KETONES UR STRIP-MCNC: ABNORMAL MG/DL
LEUKOCYTE ESTERASE UR QL STRIP: NEGATIVE
NITRATE UR QL: NEGATIVE
PH UR STRIP: 6.5 [PH] (ref 5–7)
RBC #/AREA URNS AUTO: ABNORMAL /HPF
SP GR UR STRIP: 1.01 (ref 1–1.03)
SQUAMOUS #/AREA URNS AUTO: ABNORMAL /LPF
TRICHOMONAS, WET PREP: ABNORMAL
UROBILINOGEN UR STRIP-ACNC: 0.2 E.U./DL
WBC #/AREA URNS AUTO: ABNORMAL /HPF
WBC'S/HIGH POWER FIELD, WET PREP: ABNORMAL
YEAST, WET PREP: ABNORMAL

## 2024-08-13 PROCEDURE — 99214 OFFICE O/P EST MOD 30 MIN: CPT | Performed by: FAMILY MEDICINE

## 2024-08-13 PROCEDURE — 87210 SMEAR WET MOUNT SALINE/INK: CPT | Performed by: FAMILY MEDICINE

## 2024-08-13 PROCEDURE — 81001 URINALYSIS AUTO W/SCOPE: CPT | Performed by: FAMILY MEDICINE

## 2024-08-13 ASSESSMENT — PAIN SCALES - GENERAL: PAINLEVEL: NO PAIN (0)

## 2024-08-13 NOTE — PROGRESS NOTES
Assessment & Plan     Urinary frequency  Seen today for urine frequency unchanged even after Rx with diflucan for yeast seen on wet prep  Recent UA 8/5 was normal  Continue with high fiber diet  Exam shows a cystocele ( bladder prolapse) may be contributing to symptoms  History not suggestive of kidney stones  Exam did not indicate an infection or concerning findings  Lets repeat wet prep ( obtained) and Urine today   May try pyridium OTC to help with discomfort till find out more  Stay well hydrated  Avoid too much detergent in underclothes wash,  Cotton underwear  None at night   No tight clothing  Pelvic ultrasound ordered  See urogyn to further evaluate  Also referred to PT for pelvic therapy   Continue kegels  Try not to get  constipated  So for few weeks avoid wt lifting and may defer start of bisphosphates if desired   Consider rsv and shingles vaccine at pharmacy when better   - Adult Uro/Gyn  Referral; Future  - UA with Microscopic reflex to Culture - lab collect; Future  - Wet prep - lab collect; Future  - US Pelvic Complete with Transvaginal; Future  - Physical Therapy  Referral; Future  - Wet prep - lab collect    Vulvar irritation  None seen on exam.  Plan as above  - Adult Uro/Gyn  Referral; Future  - Wet prep - lab collect; Future  - US Pelvic Complete with Transvaginal; Future  - Wet prep - lab collect    Cystocele, midline  Suspect bladder prolapse is contributing to the urinary frequency.  Referred to PT get an ultrasound pelvis and refer to urogyn.  - Adult Uro/Gyn  Referral; Future  - US Pelvic Complete with Transvaginal; Future  - Physical Therapy  Referral; Future    Need for shingles vaccine  Need for vaccination against respiratory syncytial virus  Consider these in the future    See Patient Instructions    Subjective   Pat is a 68 year old, presenting for the following health issues:  Pelvic (/Fells irritated, swollen, frequency urination, 2  wks, no odor/cloudiness, no pain, no pressure, was seen for UTI and was negative, blood in urine, was treated for yeast infection(blood on that).)        2024     9:55 AM   Additional Questions   Roomed by EMILY Ashby     History of Present Illness       Reason for visit:  Pelvic floor discomfort, urinary frequency    She eats 4 or more servings of fruits and vegetables daily.She consumes 0 sweetened beverage(s) daily.She exercises with enough effort to increase her heart rate 20 to 29 minutes per day.  She exercises with enough effort to increase her heart rate 5 days per week.   She is taking medications regularly.     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  Seen in urgent care 2020 for urinary frequency urinalysis was negative but given Bactrim, and vaginal yeast was mildly positive and given Diflucan but had no change in symptoms.  Called and spoke to triage as noted below    1. 25 evaluated in Urgent Care  2. Yeast present and took medication as prescribed  3. Symptoms not totally resolved  4. Wonders if having a pelvic floor issue  5. Sometimes feels a bulge at certain times of day if out squatting/working in garden  6. Can do Kegel exercises  7. Outer labia appear swollen   8. Unsure if any prolapse  9. Duration: since 25  10. Afebrile to her knowledge  11. No difficulty producing urine stream/keeping stream  going  12. Increased urinary frequency  13. Was pushing fluids because concerned about kidney stone  14. Wakes up early in morning to use restroom  Writer recommended evaluation today either with Primary Care or Gynecology.  Patient requested appt with Primary Care.  Appt scheduled  today with this provider  New to this provider     Feels like has to urinate all the time , vulvar/ pelvis feels irritated around vagina feels swollen, worried about something wrong with pelvic floor ? Prolapse. Seen in UC. Thought initially was a uti. That was negative. Was given diflucan for yeast found  Has had in past when exercising, if didnt change exercise clothes right away. Recently for the short period was super hot was doing Yard work & not sure if changed underwear and pants soon enough  Reports has had no fever or chills, no headache or dizziness, no double or blurry vision, no facial pain, earache, sore throat, runny nose, post nasal drip, no trouble hearing, smelling, tasting or swallowing, no cough , no chest pain, trouble breathing or palpitations, No abdominal pain, heart burn, reflux, nausea or vomiting or diarrhea or constipation, no blood in stools or black stools, no weight loss or night sweats. No dysuria, urine stings when hits irritated vulva which =feels like a burn, noted  trace blood on ua in uc 8/5 but no rbc on microscopy, when did the swab for yeast infection saw a bit of blood, no gross hematuria, feels frequency, no urgency, just irritating & not comfortable, no trouble emptying ladder, no hesitancy, no incontinence, No pelvic complaints. No vag dischanre,   Given diflucan 8/5 one pill  Was given bactrim for right 5th finger paronychia in recent past too but not taken as getting hafsa with use of water proof bandaid. Feels possibly fungal due to garden work  No leg swelling or joint pain. No rash.    Hx of kidney stone once in past, Got flushed out with pushing water and never picked up only saw evidence  of possibly having stone on imaging in past not tested    Wears tight clothing now less  Not sexually active 1 yr   A lot of sitting  Works as , managing food box up to 40 lbs wt   Also been wt lifting and doing toe stand exercises for osteoporosis recently   Lately has a student doing picking the food boxes up    Feels like a bulge in vagina    x 2, largest was 7 lbs 14 oz  Over 10 yrs ago was told bladder low   At that time was on a paleo diet and constipated but currently bowel movements normal and regular   Gets freq colonoscopy given fh of colon cancer   In past had a rectal surgery done to remove a lump told was not a wart.   They also noted an anal ain hpv 6 that was picked up during surveillance scopes in region previously lump removed & since monitored and has had no recurrence since then       Review of Systems  Constitutional, HEENT, cardiovascular, pulmonary, GI, , musculoskeletal, neuro, skin, endocrine and psych systems are negative, except as otherwise noted.      Objective    /80 (BP Location: Right arm, Patient Position: Sitting, Cuff Size: Adult Regular)   Pulse 90   Temp 97.8  F (36.6  C) (Temporal)   Resp 21   Wt 56.5 kg (124 lb 8 oz)   LMP 2004   SpO2 100%   BMI 21.79 kg/m    Body mass index is 21.79 kg/m .  Physical Exam   GENERAL: alert and no distress  EYES: Eyes grossly normal to inspection, PERRL and conjunctivae and sclerae normal, glasses  HENT: ear canals and TM's normal, nose and mouth without ulcers or lesions  NECK: no adenopathy, no asymmetry, masses, or scars  RESP: lungs clear to auscultation - no rales, rhonchi or wheezes  CV: regular rate and rhythm, normal S1 S2, no S3 or S4, no murmur, click or rub, no peripheral edema  ABDOMEN: soft, nontender, no hepatosplenomegaly, no masses and bowel sounds normal   (female): normal female external genitalia, normal vaginal mucosa, vaginal mucosal atrophy, normal cervix, adnexae, and uterus without  masses., and cystocele present. Wet prep obtained  MS: no gross musculoskeletal defects noted, no edema  SKIN: no suspicious lesions or rashes  NEURO: Normal strength and tone, mentation intact and speech normal  PSYCH: mentation appears normal, affect normal/bright, anxious, judgement and insight intact, and appearance well groomed    No results found for any visits on 08/13/24.  No results found for this or any previous visit (from the past 24 hour(s)).        Signed Electronically by: Paola Louise MD

## 2024-08-13 NOTE — RESULT ENCOUNTER NOTE
Hello -    Here are my comments about your recent results:  Urine analysis shows trace ketones with suggest you did not eat enough at time of this test.  Trace blood noted but this is just a dipstick and not very accurate.  Under microscopy noted skin cells with attached bacteria so there is no urine infection is just collection contamination.  As there are no nitrites or leukocyte Estrace which would be positive and not true infection.  No antibiotic indicated  RBCs look 5-10 recommend rechecking with urogynecology as this suggest microscopic hematuria but not sure if due to presence of swab done earlier.  May be worth rechecking urine analysis once symptoms are resolved and with no prior swab being done to be more accurate.  Wet prep shows no vaginal yeast or other infection    Please let us know if you have any questions or concerns.     Regards,  Paola Louise MD No

## 2024-08-13 NOTE — TELEPHONE ENCOUNTER
Call received from patient:  8/5/24 evaluated in Urgent Care  Yeast present and took medication as prescribed  Symptoms not totally resolved  Wonders if having a pelvic floor issue  Sometimes feels a bulge at certain times of day if out squatting/working in garden  Can do Kegel exercises  Outer labia appear swollen   Unsure if any prolapse  Duration: since 8/4/24  Afebrile to her knowledge  No difficulty producing urine stream/keeping stream going  Increased urinary frequency  Was pushing fluids because concerned about kidney stone  Wakes up early in morning to use restroom    Writer recommended evaluation today either with Primary Care or Gynecology.  Patient requested appt with Primary Care.  Appt scheduled at 0950 today with Dr. Paola Louise.  Visit date, time and location confirmed with patient.    Patient verbalized understanding and in agreement with plan.    MELI NewellN, RN-[facility]      Reason for Disposition   Urinating more frequently than usual (i.e., frequency)    Additional Information   Negative: Shock suspected (e.g., cold/pale/clammy skin, too weak to stand, low BP, rapid pulse)   Negative: Sounds like a life-threatening emergency to the triager   Negative: Followed a female genital area injury (e.g., labia, vagina, vulva)   Negative: Followed a male genital area injury (penis, scrotum)   Negative: Vaginal discharge   Negative: Pus (white, yellow) or bloody discharge from end of penis   Negative: Pain or burning with passing urine (urination) and pregnant   Negative: Pain or burning with passing urine (urination) and female   Negative: Pain or burning with passing urine (urination) and male   Negative: Pain or itching in the vulvar area   Negative: Pain in scrotum is main symptom   Negative: Blood in the urine is main symptom   Negative: Symptoms arising from use of a urinary catheter (e.g., [place], [place])   Negative: Unable to urinate (or only a few drops) > 4  hours and bladder feels very full (e.g., palpable bladder or strong urge to urinate)   Negative: Decreased urination and drinking very little and dehydration suspected (e.g., dark urine, no urine > 12 hours, very dry mouth, very lightheaded)   Negative: Patient sounds very sick or weak to the triager   Negative: Fever > 100.4 F  (38.0 C)   Negative: Side (flank) or lower back pain present   Negative: Bad or foul-smelling urine    Protocols used: Urinary Symptoms-A-OH

## 2024-08-13 NOTE — PATIENT INSTRUCTIONS
Seen today for urine frequency unchanged even after rx with diflucan for yeast seen on wet prep  Recent UA 8/5 was normal  Continue with high fiber diet  Exam shows a cystocele ( bladder prolapse) may be contributing to symptoms  History not suggestive of kidney stones  Exam did not indicate an infection or concerning findings  Lets repeat wet prep ( obtained) and Urine today   May try pyridium otc to help with discomfort till find out more  Stay well hydrated  Avoid too much detergent in underclothes wash,  Cotton underwear  None at night   No tight clothing  Pelvic ultrasound ordered  See urogyn to further evaluate  Also referred to PT for pelvic therapy   Continue jc  Try not to gegt constipated  So for few weeks avoid wt lifting and may defer start of bisphosphates if desired   Consider rsv and shingles vaccine at pharmacy when better

## 2024-08-15 ENCOUNTER — HOSPITAL ENCOUNTER (OUTPATIENT)
Dept: ULTRASOUND IMAGING | Facility: CLINIC | Age: 69
Discharge: HOME | End: 2024-08-15
Attending: FAMILY MEDICINE | Admitting: FAMILY MEDICINE
Payer: COMMERCIAL

## 2024-08-15 DIAGNOSIS — N81.11 CYSTOCELE, MIDLINE: ICD-10-CM

## 2024-08-15 DIAGNOSIS — R35.0 URINARY FREQUENCY: ICD-10-CM

## 2024-08-15 DIAGNOSIS — N90.89 VULVAR IRRITATION: ICD-10-CM

## 2024-08-15 PROCEDURE — 76830 TRANSVAGINAL US NON-OB: CPT

## 2024-08-28 ENCOUNTER — OFFICE VISIT (OUTPATIENT)
Dept: UROLOGY | Facility: CLINIC | Age: 69
End: 2024-08-28
Attending: FAMILY MEDICINE
Payer: COMMERCIAL

## 2024-08-28 VITALS
BODY MASS INDEX: 21.72 KG/M2 | SYSTOLIC BLOOD PRESSURE: 144 MMHG | HEART RATE: 101 BPM | HEIGHT: 63 IN | DIASTOLIC BLOOD PRESSURE: 88 MMHG | WEIGHT: 122.6 LBS

## 2024-08-28 DIAGNOSIS — R35.0 URINARY FREQUENCY: ICD-10-CM

## 2024-08-28 DIAGNOSIS — N90.89 VULVAR IRRITATION: ICD-10-CM

## 2024-08-28 DIAGNOSIS — N81.11 CYSTOCELE, MIDLINE: ICD-10-CM

## 2024-08-28 DIAGNOSIS — N95.2 VAGINAL ATROPHY: Primary | ICD-10-CM

## 2024-08-28 PROCEDURE — G0463 HOSPITAL OUTPT CLINIC VISIT: HCPCS | Performed by: OBSTETRICS & GYNECOLOGY

## 2024-08-28 PROCEDURE — 99204 OFFICE O/P NEW MOD 45 MIN: CPT | Performed by: OBSTETRICS & GYNECOLOGY

## 2024-08-28 RX ORDER — ESTRADIOL 0.1 MG/G
1 CREAM VAGINAL
Qty: 42.5 G | Refills: 3 | Status: SHIPPED | OUTPATIENT
Start: 2024-08-28

## 2024-08-28 NOTE — PATIENT INSTRUCTIONS
Thank you for trusting us with your care!   Please be aware, if you are on Mychart, you may see your results prior to your providers review. If labs are abnormal, we will call or message you on Getablet with a follow up plan.    If you need to contact us for questions about:  Symptoms, Scheduling & Medical Questions; Non-urgent (2-3 day response) [employer] message, Urgent (needing response today) [phone] (if after 3:30pm next day response)   Prescriptions: Please call your Pharmacy   Billing: Jose [phone] or [facility]:[phone]

## 2024-08-28 NOTE — PROGRESS NOTES
[facility] [place]  [address], [address]  [facility] [address]  Phone: [phone]  Fax: [phone]    Patient:  Cinthya Higgins, Date of birth 1955  Date of Visit:  08/28/2024  Referring Provider Paola Louise  Primary Care Provider: Venecia Torres    Assessment & Plan      A/P: Cinthya Higgins is a 68 year old F with:     1) Cystocele  Complete referral to PFPT. F/U in 6 months or sooner PRN    2) Vaginal Atrophy  Start estrace cream 3 times/week at bedtime.      45 minutes spent by me on the date of the encounter doing chart review, history and exam, documentation and further activities per the note       History of Present Illness     Pertinent history obtain from: chart review and patient  Pat reports that she started to feel increased urinary urgency in the 1st week in August. She also had been experiencing increased vaginal pressure that has improved with Kegels.     Prolapse:  Do you feel a vaginal bulge? yes                                      Pressure? yes   Do you have to place your fingers in the vagina or in the rectum to have a bowel movement? no  Impact to quality of life?  minimal     Stress Incontinence:  Do you leak urine with cough, sneeze, exercise? no  How often do you leak with cough, sneeze, exercise?  -  How much do you usually leak? -   Do you wear a pad? - If so; -  Impact to quality of life? -    Urge Incontinence:  Do you often get sudden urges to urinate? no  How often do have urges? -  If so, do you leak with these urges? -  How much do you usually leak? -  Impact to quality of life? -    Voids/day:>10  Nocturia: occ  Fluid intake: -  Caffeine: -    Urinating:  Difficulty starting urination or strain to void? -  Weak or intermittent stream? -  Incomplete emptying or dribbling? -  Pain or burning with urination? -  Any blood in your urine? -    GI:  Constipation? no  Frequency stools daily     Straining for stools no  Stool consistency normal     Ever leak stool (Accidental Bowel Leakage)? no      If so, how often?               -      If so, do you leak?                   -      Soiling without sensation? -  History of irritable bowel or Crohn's? -    Sexual/Pain:  Are you currently having sex?. No, partner issues  Pain with sex?   -   Sexual Partner: -  Do any of these symptoms interfere with sex? -  Impact to quality of life? -    Prior therapy:  Ever done pelvic floor physical therapy? no  Trial of medication? no  Have you ever tried a pessary? no    Medical History:  Do you have?   High Cholesterol? mild     Diabetes? no  High Blood pressure? no     Recurrent UTIs? no  Sleep Apnea? mild  Other medical problems: no    Surgical History:    Hysterectomy? no   Bladder Surgery? no   Other? -     OB/Gyn History:  Pregnancies? 2  Deliveries? 2  Vaginal 2  Section 0  Current birth control? -  Periods? -  When was the first day of your last period? -  Last Pap smear? - Any abnormal? -  Last mammogram? -  Last colonoscopy? -    Medications/Vitamins/Supplements: -    Drug Allergies: Ampicillin    Latex Allergy: no  Iodine Allergy no    Family History: (list relationship and age at diagnosis)  Breast cancer? -   Ovarian cancer? -   Colon cancer? -  Other? -    Social History:  Marital status:   Do you/ have you ever smoke(d)  cigarettes? no  Drink more than 1 alcoholic beverage a day?  occ  Occupation?     In the past 3 months have you regularly experienced:  Chest pain w/ walking/exercise? no                   Unusual headaches? no  Leg pain w/ walking/exercise? no                       Easy bruising? no  Difficulty breathing w/ walking/exercise? no  Problems with vision? no  Dizziness, falls, or fainting? no  Excessive bleeding from cuts, gums, surgery? no  Other: no    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, positive for HPV 16     Nephrolithiasis      (normal spontaneous vaginal delivery)     x 2       Past Surgical History:   Procedure Laterality Date    BREAST CYST ASPIRATION Left     COLONOSCOPY      ENDOMETRIAL BIOPSY      Endometrial bx    EYE SURGERY      plugged tear duct    GI SURGERY      benign rectal growth    HC REMOVAL OF OVARIAN CYST(S)      Description: Ovarian Cystectomy;  Proc Date: 1980;       Social History     Socioeconomic History    Marital status:      Spouse name: David    Number of children: 2    Years of education: Not on file    Highest education level: Not on file   Occupational History    Occupation:      Employer: [employer]   Tobacco Use    Smoking status: Never     Passive exposure: Never    Smokeless tobacco: Never   Vaping Use    Vaping status: Never Used   Substance and Sexual Activity    Alcohol use: Yes     Comment: infrequent - one glass of wine once or twice a month    Drug use: Never    Sexual activity: Not Currently     Partners: Male     Birth control/protection: Post-menopausal   Other Topics Concern     Service Not Asked    Blood Transfusions No    Caffeine Concern No    Occupational Exposure Not Asked    Hobby Hazards Not Asked    Sleep Concern No    Stress Concern Yes     Comment: alot of stresses in her life right now    Weight Concern No    Special Diet No    Back Care No    Exercise Yes     Comment: does aerobics    Bike Helmet Not Asked    Seat Belt Yes    Self-Exams Yes    Parent/sibling w/ CABG, MI or angioplasty before 65F 55M? No   Social History Narrative    2024: lives with , no pets, work half time,  at low income [employer]     Social Determinants of Health     Financial Resource Strain: Low Risk  (2024)    Financial Resource Strain     Within the past 12 months, have you or your family members you live with been unable to get utilities (heat, electricity) when it was really needed?: No   Food Insecurity:  Low Risk  (6/30/2024)    Food Insecurity     Within the past 12 months, did you worry that your food would run out before you got money to buy more?: No     Within the past 12 months, did the food you bought just not last and you didn t have money to get more?: No   Transportation Needs: Low Risk  (6/30/2024)    Transportation Needs     Within the past 12 months, has lack of transportation kept you from medical appointments, getting your medicines, non-medical meetings or appointments, work, or from getting things that you need?: No   Physical Activity: Sufficiently Active (6/30/2024)    Exercise Vital Sign     Days of Exercise per Week: 5 days     Minutes of Exercise per Session: 30 min   Stress: No Stress Concern Present (6/30/2024)    Citizen of [place] of Occupational Health - Occupational Stress Questionnaire     Feeling of Stress : Not at all   Social Connections: Unknown (6/30/2024)    Social Connection and Isolation Panel [NHANES]     Frequency of Communication with Friends and Family: Not on file     Frequency of Social Gatherings with Friends and Family: Twice a week     Attends Orthodoxy Services: Not on file     Active Member of Clubs or Organizations: Not on file     Attends Club or Organization Meetings: Not on file     Marital Status: Not on file   Interpersonal Safety: Low Risk  (7/2/2024)    Interpersonal Safety     Do you feel physically and emotionally safe where you currently live?: Yes     Within the past 12 months, have you been hit, slapped, kicked or otherwise physically hurt by someone?: No     Within the past 12 months, have you been humiliated or emotionally abused in other ways by your partner or ex-partner?: No   Housing Stability: Low Risk  (6/30/2024)    Housing Stability     Do you have housing? : Yes     Are you worried about losing your housing?: No       Family History   Problem Relation Age of Onset    Hypertension Mother     Dementia Mother     Osteoporosis Mother     Hearing Loss  "Mother         age-related    Arthritis Father     Cerebrovascular Disease Father         at age 85    Colon Cancer Father     Prostate Cancer Father         at age 85    No Known Problems Brother     No Known Problems Brother     Neurologic Disorder Maternal Grandmother         parkinson's disease    Cancer Maternal Grandfather         luekemia    Alzheimer Disease Paternal Grandmother     Lung Cancer Paternal Grandfather         smoker    Myocardial Infarction Paternal Grandfather         in his 50's    Breast Cancer No family hx of        ROS    Allergies   Allergen Reactions    Ampicillin        Current Outpatient Medications   Medication Sig Dispense Refill    estradiol (ESTRACE) 0.1 MG/GM vaginal cream Place 1 g vaginally three times a week. 42.5 g 3    fish oil-omega-3 fatty acids 1000 MG capsule       multivitamin w/minerals (MULTI-VITAMIN) tablet       sodium chloride (OCEAN) 0.65 % nasal spray Spray 1 spray into both nostrils daily as needed       No current facility-administered medications for this visit.       Physical Exam     Vital signs:  BP (!) 144/88   Pulse 101   Ht 1.61 m (5' 3.39\")   Wt 55.6 kg (122 lb 9.6 oz)   LMP 08/08/2004   BMI 21.45 kg/m          Ms. Higgins is alert, comfortable in no acute distress, non-labored breathing.   Abdomen is soft, non-tender, non-distended, no CVAT.    Normal external female genitalia. The urethra was atrophic with a caruncle.    She has a cystocele to HR, but o/w good support on supine strain.  Speculum and bimanual exam are remarkable for atrophic vaginal epithelium.      3/5 kegels.      POPQ  Aa 1   Ba 1  C -7  D -8  GH 3  PB 3  TVL 10  Ap -2  Bp -2      Data   Laboratory data and imaging listed below were reviewed as part of this encounter.                        Reuben Lawson MD  Professor, OB/GYN  Urogynecologist  CC  Patient Care Team:  Venecia Torres PA-C as PCP - General (Physician Assistant - Medical)  Venecia Torres PA-C as Assigned PCP  Boubacar, " MD Popeye as Assigned Sleep Provider  Reuben Lawson MD as MD (OB/Gyn)  RAJ ALANIZ

## 2024-08-28 NOTE — LETTER
8/28/2024       RE: Cinthya Higgins  [address]  [place] [address]     Dear Colleague,    Thank you for referring your patient, Cinthya Higgins, to the [facility] [place] [facility]. Please see a copy of my visit note below.      [facility] [place]  [address], [address]  [place] [facility] [address]  Phone: [phone]  Fax: [phone]    Patient:  Cinthya Higgins, Date of birth 1955  Date of Visit:  08/28/2024  Referring Provider Paola Louise  Primary Care Provider: Venecia Torres    Assessment & Plan     A/P: Cinthya Higgins is a 68 year old F with:     1) Cystocele  Complete referral to PFPT. F/U in 6 months or sooner PRN    2) Vaginal Atrophy  Start estrace cream 3 times/week at bedtime.      45 minutes spent by me on the date of the encounter doing chart review, history and exam, documentation and further activities per the note       History of Present Illness    Pertinent history obtain from: chart review and patient  Pat reports that she started to feel increased urinary urgency in the 1st week in August. She also had been experiencing increased vaginal pressure that has improved with Kegels.     Prolapse:  Do you feel a vaginal bulge? yes                                      Pressure? yes   Do you have to place your fingers in the vagina or in the rectum to have a bowel movement? no  Impact to quality of life?  minimal     Stress Incontinence:  Do you leak urine with cough, sneeze, exercise? no  How often do you leak with cough, sneeze, exercise?  -  How much do you usually leak? -   Do you wear a pad? - If so; -  Impact to quality of life? -    Urge Incontinence:  Do you often get sudden urges to urinate? no  How often do have urges? -  If so, do you leak with these urges? -  How much do you usually leak? -  Impact to quality of life?  -    Voids/day:>10  Nocturia: occ  Fluid intake: -  Caffeine: -    Urinating:  Difficulty starting urination or strain to void? -  Weak or intermittent stream? -  Incomplete emptying or dribbling? -  Pain or burning with urination? -  Any blood in your urine? -    GI:  Constipation? no  Frequency stools daily    Straining for stools no  Stool consistency normal     Ever leak stool (Accidental Bowel Leakage)? no      If so, how often?               -      If so, do you leak?                   -      Soiling without sensation? -  History of irritable bowel or Crohn's? -    Sexual/Pain:  Are you currently having sex?. No, partner issues  Pain with sex?   -   Sexual Partner: -  Do any of these symptoms interfere with sex? -  Impact to quality of life? -    Prior therapy:  Ever done pelvic floor physical therapy? no  Trial of medication? no  Have you ever tried a pessary? no    Medical History:  Do you have?   High Cholesterol? mild     Diabetes? no  High Blood pressure? no     Recurrent UTIs? no  Sleep Apnea? mild  Other medical problems: no    Surgical History:    Hysterectomy? no   Bladder Surgery? no   Other? -     OB/Gyn History:  Pregnancies? 2  Deliveries? 2  Vaginal 2  Section 0  Current birth control? -  Periods? -  When was the first day of your last period? -  Last Pap smear? - Any abnormal? -  Last mammogram? -  Last colonoscopy? -    Medications/Vitamins/Supplements: -    Drug Allergies: Ampicillin    Latex Allergy: no  Iodine Allergy no    Family History: (list relationship and age at diagnosis)  Breast cancer? -   Ovarian cancer? -   Colon cancer? -  Other? -    Social History:  Marital status:   Do you/ have you ever smoke(d)  cigarettes? no  Drink more than 1 alcoholic beverage a day?  occ  Occupation?     In the past 3 months have you regularly experienced:  Chest pain w/ walking/exercise? no                   Unusual headaches? no  Leg pain w/ walking/exercise? no                        Easy bruising? no  Difficulty breathing w/ walking/exercise? no  Problems with vision? no  Dizziness, falls, or fainting? no  Excessive bleeding from cuts, gums, surgery? no  Other: no    Past Medical History:   Diagnosis Date     Mass of anus     AIN 1 per pathology, positive for HPV 16     Nephrolithiasis       (normal spontaneous vaginal delivery)     x 2       Past Surgical History:   Procedure Laterality Date     BREAST CYST ASPIRATION Left      COLONOSCOPY       ENDOMETRIAL BIOPSY      Endometrial bx     EYE SURGERY      plugged tear duct     GI SURGERY      benign rectal growth     HC REMOVAL OF OVARIAN CYST(S)      Description: Ovarian Cystectomy;  Proc Date: 1980;       Social History     Socioeconomic History     Marital status:      Spouse name: David     Number of children: 2     Years of education: Not on file     Highest education level: Not on file   Occupational History     Occupation:      Employer: [employer]   Tobacco Use     Smoking status: Never     Passive exposure: Never     Smokeless tobacco: Never   Vaping Use     Vaping status: Never Used   Substance and Sexual Activity     Alcohol use: Yes     Comment: infrequent - one glass of wine once or twice a month     Drug use: Never     Sexual activity: Not Currently     Partners: Male     Birth control/protection: Post-menopausal   Other Topics Concern      Service Not Asked     Blood Transfusions No     Caffeine Concern No     Occupational Exposure Not Asked     Hobby Hazards Not Asked     Sleep Concern No     Stress Concern Yes     Comment: alot of stresses in her life right now     Weight Concern No     Special Diet No     Back Care No     Exercise Yes     Comment: does aerobics     Bike Helmet Not Asked     Seat Belt Yes     Self-Exams Yes     Parent/sibling w/ CABG, MI or angioplasty before 65F 55M? No   Social History Narrative    2024: lives with , no pets, work half time,   at [employer]     Social Determinants of Health     Financial Resource Strain: Low Risk  (6/30/2024)    Financial Resource Strain      Within the past 12 months, have you or your family members you live with been unable to get utilities (heat, electricity) when it was really needed?: No   Food Insecurity: Low Risk  (6/30/2024)    Food Insecurity      Within the past 12 months, did you worry that your food would run out before you got money to buy more?: No      Within the past 12 months, did the food you bought just not last and you didn t have money to get more?: No   Transportation Needs: Low Risk  (6/30/2024)    Transportation Needs      Within the past 12 months, has lack of transportation kept you from medical appointments, getting your medicines, non-medical meetings or appointments, work, or from getting things that you need?: No   Physical Activity: Sufficiently Active (6/30/2024)    Exercise Vital Sign      Days of Exercise per Week: 5 days      Minutes of Exercise per Session: 30 min   Stress: No Stress Concern Present (6/30/2024)    [place] of Occupational Health - Occupational Stress Questionnaire      Feeling of Stress : Not at all   Social Connections: Unknown (6/30/2024)    Social Connection and Isolation Panel [NHANES]      Frequency of Communication with Friends and Family: Not on file      Frequency of Social Gatherings with Friends and Family: Twice a week      Attends Scientologist Services: Not on file      Active Member of Clubs or Organizations: Not on file      Attends Club or Organization Meetings: Not on file      Marital Status: Not on file   Interpersonal Safety: Low Risk  (7/2/2024)    Interpersonal Safety      Do you feel physically and emotionally safe where you currently live?: Yes      Within the past 12 months, have you been hit, slapped, kicked or otherwise physically hurt by someone?: No      Within the past 12 months, have you been humiliated  "or emotionally abused in other ways by your partner or ex-partner?: No   Housing Stability: Low Risk  (6/30/2024)    Housing Stability      Do you have housing? : Yes      Are you worried about losing your housing?: No       Family History   Problem Relation Age of Onset     Hypertension Mother      Dementia Mother      Osteoporosis Mother      Hearing Loss Mother         age-related     Arthritis Father      Cerebrovascular Disease Father         at age 85     Colon Cancer Father      Prostate Cancer Father         at age 85     No Known Problems Brother      No Known Problems Brother      Neurologic Disorder Maternal Grandmother         parkinson's disease     Cancer Maternal Grandfather         luekemia     Alzheimer Disease Paternal Grandmother      Lung Cancer Paternal Grandfather         smoker     Myocardial Infarction Paternal Grandfather         in his 50's     Breast Cancer No family hx of        ROS    Allergies   Allergen Reactions     Ampicillin        Current Outpatient Medications   Medication Sig Dispense Refill     estradiol (ESTRACE) 0.1 MG/GM vaginal cream Place 1 g vaginally three times a week. 42.5 g 3     fish oil-omega-3 fatty acids 1000 MG capsule        multivitamin w/minerals (MULTI-VITAMIN) tablet        sodium chloride (OCEAN) 0.65 % nasal spray Spray 1 spray into both nostrils daily as needed       No current facility-administered medications for this visit.       Physical Exam    Vital signs:  BP (!) 144/88   Pulse 101   Ht 1.61 m (5' 3.39\")   Wt 55.6 kg (122 lb 9.6 oz)   LMP 08/08/2004   BMI 21.45 kg/m          Ms. Higgins is alert, comfortable in no acute distress, non-labored breathing.   Abdomen is soft, non-tender, non-distended, no CVAT.    Normal external female genitalia. The urethra was atrophic with a caruncle.    She has a cystocele to HR, but o/w good support on supine strain.  Speculum and bimanual exam are remarkable for atrophic vaginal epithelium.      3/5 ishmaels.  "     POPQ  Aa 1   Ba 1  C -7  D -8  GH 3  PB 3  TVL 10  Ap -2  Bp -2      Data  Laboratory data and imaging listed below were reviewed as part of this encounter.                        Reuben Lawson MD  Professor, OB/GYN  Urogynecologist  CC  Patient Care Team:  Venecia Torres PA-C as PCP - General (Physician Assistant - Medical)  Venecia Torres PA-C as Assigned PCP  Popeye Hamlin MD as Assigned Sleep Provider  Reuben Lawson MD as MD (OB/Gyn)  RAJ ALANIZ                Again, thank you for allowing me to participate in the care of your patient.      Sincerely,    Reuben Lawson MD

## 2024-09-19 ENCOUNTER — OFFICE VISIT (OUTPATIENT)
Dept: FAMILY MEDICINE | Facility: CLINIC | Age: 69
End: 2024-09-19
Payer: COMMERCIAL

## 2024-09-19 VITALS
OXYGEN SATURATION: 100 % | TEMPERATURE: 98 F | HEART RATE: 90 BPM | WEIGHT: 124.2 LBS | RESPIRATION RATE: 12 BRPM | SYSTOLIC BLOOD PRESSURE: 120 MMHG | BODY MASS INDEX: 21.21 KG/M2 | DIASTOLIC BLOOD PRESSURE: 68 MMHG | HEIGHT: 64 IN

## 2024-09-19 DIAGNOSIS — M81.0 AGE-RELATED OSTEOPOROSIS WITHOUT CURRENT PATHOLOGICAL FRACTURE: Primary | ICD-10-CM

## 2024-09-19 DIAGNOSIS — N81.11 CYSTOCELE, MIDLINE: ICD-10-CM

## 2024-09-19 PROCEDURE — 99213 OFFICE O/P EST LOW 20 MIN: CPT | Performed by: PHYSICIAN ASSISTANT

## 2024-09-19 PROCEDURE — G2211 COMPLEX E/M VISIT ADD ON: HCPCS | Performed by: PHYSICIAN ASSISTANT

## 2024-09-19 RX ORDER — ALENDRONATE SODIUM 70 MG/1
70 TABLET ORAL
Qty: 12 TABLET | Refills: 3 | Status: SHIPPED | OUTPATIENT
Start: 2024-09-19

## 2024-09-19 ASSESSMENT — PAIN SCALES - GENERAL: PAINLEVEL: NO PAIN (0)

## 2024-09-19 NOTE — PROGRESS NOTES
"  Assessment & Plan     Age-related osteoporosis without current pathological fracture - will start bisphosphonate, she chooses weekly dosing. Follow up for repeat dexa in 2 years.  - alendronate (FOSAMAX) 70 MG tablet  Dispense: 12 tablet; Refill: 3    Cystocele, midline - following with urogyn, pelvic floor PT.      The longitudinal plan of care for the diagnosis(es)/condition(s) as documented were addressed during this visit. Due to the added complexity in care, I will continue to support Maria Dolores in the subsequent management and with ongoing continuity of care.      Subjective   Pat is a 68 year old, presenting for the following health issues:  Vaginal Problem (F/U - Prolapse bladder)        9/19/2024    12:29 PM   Additional Questions   Roomed by Teresita NAVA MA   Accompanied by Self     Next week starts pelvic floor PT  Had PT friend who has helped her learn some exercises and this has been helping  Urinary frequency ongoing but improved  Using vaginal estrogen three times a week - going well    DEXA results - open to bisphosphonate  Aunt has history of necrotic jaw with bisphosphonate    History of Present Illness       Reason for visit:  F/u prolapsed bladder, osteoporosis medication    She eats 4 or more servings of fruits and vegetables daily.She consumes 0 sweetened beverage(s) daily.She exercises with enough effort to increase her heart rate 10 to 19 minutes per day.  She exercises with enough effort to increase her heart rate 5 days per week.   She is taking medications regularly.         Objective    /68 (BP Location: Right arm, Patient Position: Sitting, Cuff Size: Adult Regular)   Pulse 90   Temp 98  F (36.7  C) (Temporal)   Resp 12   Ht 1.626 m (5' 4\")   Wt 56.3 kg (124 lb 3.2 oz)   LMP 08/08/2004   SpO2 100%   BMI 21.32 kg/m    Body mass index is 21.32 kg/m .  Physical Exam   GENERAL: alert and no distress  PSYCH: mentation appears normal, affect normal/bright          Signed Electronically " by: Venecia Torres PA-C

## 2024-09-26 ENCOUNTER — THERAPY VISIT (OUTPATIENT)
Dept: PHYSICAL THERAPY | Facility: CLINIC | Age: 69
End: 2024-09-26
Payer: COMMERCIAL

## 2024-09-26 DIAGNOSIS — R35.0 URINARY FREQUENCY: ICD-10-CM

## 2024-09-26 DIAGNOSIS — N81.11 CYSTOCELE, MIDLINE: ICD-10-CM

## 2024-09-26 PROCEDURE — 97110 THERAPEUTIC EXERCISES: CPT | Mod: GP

## 2024-09-26 PROCEDURE — 97535 SELF CARE MNGMENT TRAINING: CPT | Mod: GP

## 2024-09-26 PROCEDURE — 97161 PT EVAL LOW COMPLEX 20 MIN: CPT | Mod: GP

## 2024-09-26 NOTE — PROGRESS NOTES
PHYSICAL THERAPY EVALUATION  Type of Visit: Evaluation        Fall Risk Screen:  Fall screen completed by: PT  Have you fallen 2 or more times in the past year?: No  Have you fallen and had an injury in the past year?: No  Is patient a fall risk?: No    Subjective       Presenting condition or subjective complaint: prolapsed bladder, frequent need to urinate. Prolapse was reported this past year, a provider noted her bladder sitting low about 8 years ago. Increased urge to urinate and pouching that is uncomfortable. Started some pelvic exercises recently that has improved symptoms. Had been performing weight lifting exercises regularly with 10 pound weights for bone building, discontinued with provider's recommendation until PT.   Date of onset: 08/13/24 (date of order)    Relevant medical history: Bladder or bowel problems; Osteoporosis; Severe dizziness; Sleep disorder like apnea; Thyroid problems; Vision problems   Dates & types of surgery: 1980 ovarian cyst, 1997 plugged tear duct, 2022 benign rectal mass, 2023 cataract surgery    Prior diagnostic imaging/testing results: X-ray     Prior therapy history for the same diagnosis, illness or injury: No      Prior Level of Function  Transfers:   Ambulation:   ADL:   IADL:     Living Environment  Social support: With a significant other or spouse   Type of home: House   Stairs to enter the home: Yes 4 Is there a railing: Yes     Ramp: No   Stairs inside the home: Yes 16 Is there a railing: Yes     Help at home: None  Equipment owned:       Employment: Yes   Hobbies/Interests: gardening, reading, writing, walking    Patient goals for therapy: strengthen my pelvic floor, decrease need to urinate    Pain assessment:      Objective      PELVIC EVALUATION  ADDITIONAL HISTORY:  Sex assigned at birth: Female  Gender identity: Female    Pronouns: She/Her Hers      Bladder History:  Urinates 12-15 times per day.   Feels bladder filling: Yes  Triggers for feeling  of inability to wait to go to the bathroom: Yes knowing that I won't have access to a bathroom for awhile. Green tea may be an irritant because morning urinates more frequently.   How long can you wait to urinate: a few hours  Gets up at night to urinate: Yes only if I drink after 7:30 pm -- then once, otherwise no  Can stop the flow of urine when urinating: No  Volume of urine usually released: Medium   Other issues: Slow or hesitant urine stream; Trouble emptying bladder completely  Number of bladder infections in last 12 months:    Fluid intake per day: 32 oz -- varies. 16 oz green tea    Medications taken for bladder: No     Activities causing urine leak:      Amount of urine typically leaked:    Pads used to help with leaking:          Bowel History:  Frequency of bowel movement: mostly daily, sometimes every other day  Consistency of stool: Soft-formed    Ignores the urge to defecate: No  Other bowel issues:    Length of time spent trying to have a bowel movement:      Sexual Function History:  Sexual orientation: Straight    Sexually active: No  Lubrication used:      Pelvic pain:      Pain or difficulty with orgasms/erection/ejaculation:      State of menopause: Post-menopause (I am done with menopause)  Hormone medications: Yes estradiol vaginal cream, started after her session with urology.     Are you currently pregnant: No  Number of previous pregnancies: 2  Number of deliveries: 2  If you have delivered before, did you have any of these issues during delivery: Tearing  Have you been diagnosed with pelvic prolapse or abdominal separation: No  Do you get regular exercise: Yes  I do this type of exercise: walking, weight lifting  Have you tried pelvic floor strengthening exercises for 4 weeks: Yes  Do you have any history of trauma that is relevant to your care that you d like to share: No      Discussed reason for referral regarding pelvic health needs and external/internal pelvic floor muscle examination  with patient/guardian.  Opportunity provided to ask questions and verbal consent for assessment and intervention was given.      BREATHING SYMMETRY:  Extends through thoracic region with rib flare     PELVIC EXAM  External Visual Inspection:  At rest: Normal  With voluntary pelvic floor contraction: Perineal elevation  Relaxation of PFM: Yes  With intra-abdominal pressure: Cough: No change  Bearing down as defecation: Perineal descent    Integumentary:   Introitus: Atrophy, urethra inflammation    External Digital Palpation per Perineum:   Ischiocavernosis: Unremarkable  Bulbo cavernosis: Unremarkable  Transverse perineal: Unremarkable  Levator ani: Unremarkable  Perineal body: Unremarkable    Internal Digital Palpation:  Per Vagina:  Myofascial Resistance to Palpation: Soft, Taut  Digital Muscle Performance: P (Power): 2+/5  E (Endurance): 5 seconds  F (Fast Twitch): 4 repetitions  Compensations: Breath holding  Relaxation Post-Contraction: Normal      Pelvic Organ Prolapse:   Anterior: At the level of the hymen  Posterior: no visual descent         Assessment & Plan   CLINICAL IMPRESSIONS  Medical Diagnosis: Urinary frequency.  Cystocele, midline    Treatment Diagnosis: Urinary fequency. Pelvic floor weakness   Impression/Assessment: Patient is a 68 year old female with pelvic heaviness, urinary urgency, urinary frequency complaints.  The following significant findings have been identified: Decreased strength, Impaired muscle performance, Decreased activity tolerance, and Impaired posture. These impairments interfere with their ability to perform self care tasks, work tasks, recreational activities, and community mobility as compared to previous level of function.     Clinical Decision Making (Complexity):  Clinical Presentation: Stable/Uncomplicated  Clinical Presentation Rationale: based on medical and personal factors listed in PT evaluation  Clinical Decision Making (Complexity): Low complexity    PLAN OF  CARE  Treatment Interventions:  Modalities: Biofeedback, Ultrasound  Interventions: Manual Therapy, Neuromuscular Re-education, Therapeutic Activity, Therapeutic Exercise, Self-Care/Home Management    Long Term Goals     PT Goal 1  Goal Identifier: IAP management  Goal Description: Patient will perform 10/10 trials of loaded exercises such as squat or overhead press with at least 10lb weights without increased pelvic pressure or cueing for core activation  Rationale: to maximize safety and independence with performance of ADLs and functional tasks;to maximize safety and independence with self cares  Target Date: 12/18/24  PT Goal 2  Goal Identifier: urinary urgency  Goal Description: Patient will void 6-8 times per day without emergent urges for at least 3 weeks to improve bladder habits  Rationale: to maximize safety and independence with performance of ADLs and functional tasks;to maximize safety and independence with self cares  Target Date: 12/18/24  PT Goal 3  Goal Identifier: PFM endurance  Goal Description: Patient will hold pelvic floor muscle contraction for 10 seconds for 10/10 trials with consistent activation  Rationale: to maximize safety and independence with performance of ADLs and functional tasks  Target Date: 12/18/24      Frequency of Treatment: 1x per week for 2 weeks, 1x per 2 weeks for 10 week adjusting frequency as indicated by patient presentation  Duration of Treatment: 12 weeks    Recommended Referrals to Other Professionals:   Education Assessment:   Learner/Method: Patient;No Barriers to Learning    Risks and benefits of evaluation/treatment have been explained.   Patient/Family/caregiver agrees with Plan of Care.     Evaluation Time:     PT Eval, Low Complexity Minutes (62860): 35       Signing Clinician: Jessica Martin PT        [facility]                                                                                   OUTPATIENT PHYSICAL  THERAPY      PLAN OF TREATMENT FOR OUTPATIENT REHABILITATION   Patient's Last Name, First Name, Cinthya Silva YOB: 1955   Provider's Name   [facility]   Medical Record No.  [id number]     Onset Date: 08/13/24 (date of order)  Start of Care Date: 09/26/24     Medical Diagnosis:  Urinary frequency.  Cystocele, midline      PT Treatment Diagnosis:  Urinary fequency. Pelvic floor weakness Plan of Treatment  Frequency/Duration: 1x per week for 2 weeks, 1x per 2 weeks for 10 week adjusting frequency as indicated by patient presentation/ 12 weeks    Certification date from 09/26/24 to 12/18/24         See note for plan of treatment details and functional goals     Jessica Martin, PT                         I CERTIFY THE NEED FOR THESE SERVICES FURNISHED UNDER        THIS PLAN OF TREATMENT AND WHILE UNDER MY CARE     (Physician attestation of this document indicates review and certification of the therapy plan).              Referring Provider:  Paola Louise    Initial Assessment  See Epic Evaluation- Start of Care Date: 09/26/24

## 2024-09-27 PROCEDURE — 88112 CYTOPATH CELL ENHANCE TECH: CPT | Mod: TC,ORL

## 2024-09-30 ENCOUNTER — LAB REQUISITION (OUTPATIENT)
Dept: LAB | Facility: CLINIC | Age: 69
End: 2024-09-30
Payer: COMMERCIAL

## 2024-09-30 DIAGNOSIS — Z00.00 ENCOUNTER FOR GENERAL ADULT MEDICAL EXAMINATION WITHOUT ABNORMAL FINDINGS: ICD-10-CM

## 2024-10-02 LAB
PATH REPORT.COMMENTS IMP SPEC: NORMAL
PATH REPORT.FINAL DX SPEC: NORMAL
PATH REPORT.GROSS SPEC: NORMAL
PATH REPORT.MICROSCOPIC SPEC OTHER STN: NORMAL
PATH REPORT.RELEVANT HX SPEC: NORMAL

## 2024-10-02 PROCEDURE — 88112 CYTOPATH CELL ENHANCE TECH: CPT | Mod: 26 | Performed by: PATHOLOGY

## 2024-10-03 ENCOUNTER — THERAPY VISIT (OUTPATIENT)
Dept: PHYSICAL THERAPY | Facility: CLINIC | Age: 69
End: 2024-10-03
Payer: COMMERCIAL

## 2024-10-03 DIAGNOSIS — N81.11 CYSTOCELE, MIDLINE: Primary | ICD-10-CM

## 2024-10-03 DIAGNOSIS — R35.0 URINARY FREQUENCY: ICD-10-CM

## 2024-10-03 PROCEDURE — 97535 SELF CARE MNGMENT TRAINING: CPT | Mod: GP

## 2024-10-10 ENCOUNTER — THERAPY VISIT (OUTPATIENT)
Dept: PHYSICAL THERAPY | Facility: CLINIC | Age: 69
End: 2024-10-10
Payer: COMMERCIAL

## 2024-10-10 DIAGNOSIS — N81.11 CYSTOCELE, MIDLINE: Primary | ICD-10-CM

## 2024-10-10 DIAGNOSIS — R35.0 URINARY FREQUENCY: ICD-10-CM

## 2024-10-10 PROCEDURE — 97530 THERAPEUTIC ACTIVITIES: CPT | Mod: GP

## 2024-10-10 PROCEDURE — 97535 SELF CARE MNGMENT TRAINING: CPT | Mod: GP

## 2024-11-18 ENCOUNTER — THERAPY VISIT (OUTPATIENT)
Dept: PHYSICAL THERAPY | Facility: CLINIC | Age: 69
End: 2024-11-18
Payer: COMMERCIAL

## 2024-11-18 DIAGNOSIS — N81.11 CYSTOCELE, MIDLINE: Primary | ICD-10-CM

## 2024-11-18 DIAGNOSIS — R35.0 URINARY FREQUENCY: ICD-10-CM

## 2024-11-18 PROCEDURE — 97110 THERAPEUTIC EXERCISES: CPT | Mod: GP

## 2024-11-18 PROCEDURE — 97535 SELF CARE MNGMENT TRAINING: CPT | Mod: GP

## 2024-12-04 ENCOUNTER — THERAPY VISIT (OUTPATIENT)
Dept: PHYSICAL THERAPY | Facility: CLINIC | Age: 69
End: 2024-12-04
Payer: COMMERCIAL

## 2024-12-04 DIAGNOSIS — N81.11 CYSTOCELE, MIDLINE: Primary | ICD-10-CM

## 2024-12-04 DIAGNOSIS — R35.0 URINARY FREQUENCY: ICD-10-CM

## 2025-01-09 ENCOUNTER — TRANSFERRED RECORDS (OUTPATIENT)
Dept: HEALTH INFORMATION MANAGEMENT | Facility: CLINIC | Age: 70
End: 2025-01-09
Payer: COMMERCIAL

## 2025-01-16 ENCOUNTER — PATIENT OUTREACH (OUTPATIENT)
Dept: GASTROENTEROLOGY | Facility: CLINIC | Age: 70
End: 2025-01-16
Payer: COMMERCIAL

## 2025-01-16 ENCOUNTER — E-VISIT (OUTPATIENT)
Dept: URGENT CARE | Facility: CLINIC | Age: 70
End: 2025-01-16
Payer: COMMERCIAL

## 2025-01-16 DIAGNOSIS — R30.0 DIFFICULT OR PAINFUL URINATION: Primary | ICD-10-CM

## 2025-01-16 PROBLEM — D12.6 ADENOMATOUS POLYP OF COLON: Status: ACTIVE | Noted: 2025-01-16

## 2025-01-16 NOTE — PATIENT INSTRUCTIONS
Dear Cinthya Higgins,     After reviewing your responses, I would like you to come in for a urine test to make sure we treat you correctly. This urine test is to evaluate you for a possible urinary tract infection, and should be scheduled for today or tomorrow. Schedule a Lab Only appointment here.     Lab appointments are not available at most locations on the weekends. If no Lab Only appointment is available, you should be seen in any of our convenient Walk-in or Urgent Care Centers, which can be found on our website here.     You will receive instructions with your results in [facility] once they are available.     If your symptoms worsen, you develop pain in your back or stomach, develop fevers, or are not improving in 5 days, please contact your primary care provider for an appointment or visit a Walk-in or Urgent Care Center to be seen.     Thanks again for choosing us as your health care partner,     CAMILLE Barron CNP

## 2025-02-26 ENCOUNTER — THERAPY VISIT (OUTPATIENT)
Dept: PHYSICAL THERAPY | Facility: CLINIC | Age: 70
End: 2025-02-26
Payer: COMMERCIAL

## 2025-02-26 DIAGNOSIS — N81.11 CYSTOCELE, MIDLINE: Primary | ICD-10-CM

## 2025-02-26 DIAGNOSIS — R35.0 URINARY FREQUENCY: ICD-10-CM

## 2025-02-26 PROCEDURE — 97535 SELF CARE MNGMENT TRAINING: CPT | Mod: GP

## 2025-02-26 PROCEDURE — 97530 THERAPEUTIC ACTIVITIES: CPT | Mod: GP

## 2025-02-26 PROCEDURE — 97140 MANUAL THERAPY 1/> REGIONS: CPT | Mod: GP

## 2025-02-27 NOTE — PROGRESS NOTES
[facility]                                                                                   OUTPATIENT PHYSICAL THERAPY    PLAN OF TREATMENT FOR OUTPATIENT REHABILITATION   Patient's Last Name, First Name, Cinthya Sliva YOB: 1955   Provider's Name   [facility]   Medical Record No.  [id number]     Onset Date: 08/13/24 (date of order)  Start of Care Date: 09/26/24     Medical Diagnosis:  Urinary frequency.  Cystocele, midline      PT Treatment Diagnosis:  Urinary fequency. Pelvic floor weakness Plan of Treatment  Frequency/Duration: 1x per week for 2 weeks, 1x per 2 weeks for 10 week adjusting frequency as indicated by patient presentation/ 12 weeks    Certification date from 12/19/24 to 03/12/25         See note for plan of treatment details and functional goals     Jessica Martin, PT                         I CERTIFY THE NEED FOR THESE SERVICES FURNISHED UNDER        THIS PLAN OF TREATMENT AND WHILE UNDER MY CARE     (Physician attestation of this document indicates review and certification of the therapy plan).              Referring Provider:  Paola Louise    Initial Assessment  See Epic Evaluation- Start of Care Date: 09/26/24            PLAN  Continue therapy per current plan of care. Patient met 1 of 3 goals, increasing pelvic floor muscle endurance to 10 seconds. Patient made progress towards additional 2 goals. Within past 2 months, reports increased LUTS and urinary urgency. Recommend continued therapy to support patient with management of symptoms after recent aggravation.     Beginning/End Dates of Progress Note Reporting Period:  09/26/24 to 02/26/2025    Referring Provider:  Paola Louise

## 2025-06-02 ENCOUNTER — PATIENT OUTREACH (OUTPATIENT)
Dept: CARE COORDINATION | Facility: CLINIC | Age: 70
End: 2025-06-02
Payer: COMMERCIAL

## 2025-07-01 ENCOUNTER — ANCILLARY PROCEDURE (OUTPATIENT)
Dept: MAMMOGRAPHY | Facility: CLINIC | Age: 70
End: 2025-07-01
Attending: PHYSICIAN ASSISTANT
Payer: COMMERCIAL

## 2025-07-01 DIAGNOSIS — Z12.31 VISIT FOR SCREENING MAMMOGRAM: ICD-10-CM

## 2025-07-01 PROCEDURE — 77067 SCR MAMMO BI INCL CAD: CPT | Mod: 26

## 2025-07-01 PROCEDURE — 77063 BREAST TOMOSYNTHESIS BI: CPT | Mod: 26

## 2025-07-01 PROCEDURE — 77067 SCR MAMMO BI INCL CAD: CPT

## 2025-08-11 SDOH — HEALTH STABILITY: PHYSICAL HEALTH: ON AVERAGE, HOW MANY DAYS PER WEEK DO YOU ENGAGE IN MODERATE TO STRENUOUS EXERCISE (LIKE A BRISK WALK)?: 5 DAYS

## 2025-08-11 SDOH — HEALTH STABILITY: PHYSICAL HEALTH: ON AVERAGE, HOW MANY MINUTES DO YOU ENGAGE IN EXERCISE AT THIS LEVEL?: 60 MIN

## 2025-08-11 ASSESSMENT — SOCIAL DETERMINANTS OF HEALTH (SDOH): HOW OFTEN DO YOU GET TOGETHER WITH FRIENDS OR RELATIVES?: THREE TIMES A WEEK

## 2025-08-12 ENCOUNTER — OFFICE VISIT (OUTPATIENT)
Dept: FAMILY MEDICINE | Facility: CLINIC | Age: 70
End: 2025-08-12
Payer: COMMERCIAL

## 2025-08-12 VITALS
BODY MASS INDEX: 21.27 KG/M2 | SYSTOLIC BLOOD PRESSURE: 130 MMHG | OXYGEN SATURATION: 100 % | TEMPERATURE: 98.6 F | HEIGHT: 64 IN | RESPIRATION RATE: 18 BRPM | HEART RATE: 94 BPM | WEIGHT: 124.6 LBS | DIASTOLIC BLOOD PRESSURE: 72 MMHG

## 2025-08-12 DIAGNOSIS — N95.2 VAGINAL ATROPHY: ICD-10-CM

## 2025-08-12 DIAGNOSIS — Z00.00 ENCOUNTER FOR MEDICARE ANNUAL WELLNESS EXAM: Primary | ICD-10-CM

## 2025-08-12 DIAGNOSIS — G47.33 OSA (OBSTRUCTIVE SLEEP APNEA): ICD-10-CM

## 2025-08-12 DIAGNOSIS — M81.0 AGE-RELATED OSTEOPOROSIS WITHOUT CURRENT PATHOLOGICAL FRACTURE: ICD-10-CM

## 2025-08-12 DIAGNOSIS — R39.89 BLADDER PAIN: ICD-10-CM

## 2025-08-12 RX ORDER — ESTRADIOL 0.1 MG/G
1 CREAM VAGINAL
Qty: 42.5 G | Refills: 3 | Status: SHIPPED | OUTPATIENT
Start: 2025-08-13

## 2025-08-12 RX ORDER — CALCIUM GLYCEROPHOSPHATE 65 MG
TABLET ORAL
COMMUNITY
Start: 2025-03-01

## 2025-08-12 RX ORDER — ALENDRONATE SODIUM 70 MG/1
70 TABLET ORAL
Qty: 12 TABLET | Refills: 3 | Status: SHIPPED | OUTPATIENT
Start: 2025-08-12

## 2025-08-12 ASSESSMENT — PAIN SCALES - GENERAL: PAINLEVEL_OUTOF10: NO PAIN (0)

## 2025-09-04 ENCOUNTER — DOCUMENTATION ONLY (OUTPATIENT)
Dept: OTHER | Facility: CLINIC | Age: 70
End: 2025-09-04
Payer: COMMERCIAL